# Patient Record
Sex: MALE | Employment: FULL TIME | URBAN - METROPOLITAN AREA
[De-identification: names, ages, dates, MRNs, and addresses within clinical notes are randomized per-mention and may not be internally consistent; named-entity substitution may affect disease eponyms.]

---

## 2021-07-07 ENCOUNTER — HOSPITAL ENCOUNTER (OUTPATIENT)
Dept: ULTRASOUND IMAGING | Age: 61
Discharge: HOME | End: 2021-07-07
Attending: NURSE PRACTITIONER
Payer: COMMERCIAL

## 2021-07-07 DIAGNOSIS — N40.1 BENIGN PROSTATIC HYPERPLASIA WITH LOWER URINARY TRACT SYMPTOMS: ICD-10-CM

## 2021-07-07 PROCEDURE — 76857 US EXAM PELVIC LIMITED: CPT | Performed by: NURSE PRACTITIONER

## 2021-07-08 ENCOUNTER — HOSPITAL ENCOUNTER (OUTPATIENT)
Facility: HOSPITAL | Age: 61
Setting detail: OBSERVATION
Discharge: HOME | End: 2021-07-10
Attending: EMERGENCY MEDICINE | Admitting: STUDENT IN AN ORGANIZED HEALTH CARE EDUCATION/TRAINING PROGRAM
Payer: COMMERCIAL

## 2021-07-08 DIAGNOSIS — N17.9 AKI (ACUTE KIDNEY INJURY) (HCC): ICD-10-CM

## 2021-07-08 DIAGNOSIS — R33.8 ACUTE URINARY RETENTION: Primary | ICD-10-CM

## 2021-07-08 LAB
ANION GAP SERPL CALC-SCNC: 6 MMOL/L (ref 0–18)
BASOPHILS # BLD AUTO: 0.03 X10(3) UL (ref 0–0.2)
BASOPHILS NFR BLD AUTO: 0.5 %
BILIRUB UR QL STRIP.AUTO: NEGATIVE
BUN BLD-MCNC: 44 MG/DL (ref 7–18)
BUN/CREAT SERPL: 12.4 (ref 10–20)
CALCIUM BLD-MCNC: 9 MG/DL (ref 8.5–10.1)
CHLORIDE SERPL-SCNC: 103 MMOL/L (ref 98–112)
CLARITY UR REFRACT.AUTO: CLEAR
CO2 SERPL-SCNC: 26 MMOL/L (ref 21–32)
CREAT BLD-MCNC: 3.54 MG/DL
DEPRECATED RDW RBC AUTO: 40.2 FL (ref 35.1–46.3)
EOSINOPHIL # BLD AUTO: 0.19 X10(3) UL (ref 0–0.7)
EOSINOPHIL NFR BLD AUTO: 3.2 %
ERYTHROCYTE [DISTWIDTH] IN BLOOD BY AUTOMATED COUNT: 12.6 % (ref 11–15)
GLUCOSE BLD-MCNC: 88 MG/DL (ref 70–99)
GLUCOSE UR STRIP.AUTO-MCNC: NEGATIVE MG/DL
HCT VFR BLD AUTO: 36.3 %
HGB BLD-MCNC: 12.4 G/DL
IMM GRANULOCYTES # BLD AUTO: 0.01 X10(3) UL (ref 0–1)
IMM GRANULOCYTES NFR BLD: 0.2 %
KETONES UR STRIP.AUTO-MCNC: NEGATIVE MG/DL
LEUKOCYTE ESTERASE UR QL STRIP.AUTO: NEGATIVE
LYMPHOCYTES # BLD AUTO: 1.78 X10(3) UL (ref 1–4)
LYMPHOCYTES NFR BLD AUTO: 29.9 %
MCH RBC QN AUTO: 29.8 PG (ref 26–34)
MCHC RBC AUTO-ENTMCNC: 34.2 G/DL (ref 31–37)
MCV RBC AUTO: 87.3 FL
MONOCYTES # BLD AUTO: 0.62 X10(3) UL (ref 0.1–1)
MONOCYTES NFR BLD AUTO: 10.4 %
NEUTROPHILS # BLD AUTO: 3.32 X10 (3) UL (ref 1.5–7.7)
NEUTROPHILS # BLD AUTO: 3.32 X10(3) UL (ref 1.5–7.7)
NEUTROPHILS NFR BLD AUTO: 55.8 %
NITRITE UR QL STRIP.AUTO: NEGATIVE
OSMOLALITY SERPL CALC.SUM OF ELEC: 291 MOSM/KG (ref 275–295)
PH UR STRIP.AUTO: 7 [PH] (ref 5–8)
PLATELET # BLD AUTO: 218 10(3)UL (ref 150–450)
POTASSIUM SERPL-SCNC: 5.3 MMOL/L (ref 3.5–5.1)
PROT UR STRIP.AUTO-MCNC: NEGATIVE MG/DL
RBC # BLD AUTO: 4.16 X10(6)UL
SODIUM SERPL-SCNC: 135 MMOL/L (ref 136–145)
SP GR UR STRIP.AUTO: 1 (ref 1–1.03)
UROBILINOGEN UR STRIP.AUTO-MCNC: <2 MG/DL
WBC # BLD AUTO: 6 X10(3) UL (ref 4–11)

## 2021-07-08 PROCEDURE — 99220 INITIAL OBSERVATION CARE,LEVL III: CPT | Performed by: STUDENT IN AN ORGANIZED HEALTH CARE EDUCATION/TRAINING PROGRAM

## 2021-07-08 RX ORDER — PROCHLORPERAZINE EDISYLATE 5 MG/ML
5 INJECTION INTRAMUSCULAR; INTRAVENOUS EVERY 8 HOURS PRN
Status: DISCONTINUED | OUTPATIENT
Start: 2021-07-08 | End: 2021-07-10

## 2021-07-08 RX ORDER — PHENAZOPYRIDINE HYDROCHLORIDE 200 MG/1
200 TABLET, FILM COATED ORAL ONCE
Status: COMPLETED | OUTPATIENT
Start: 2021-07-08 | End: 2021-07-08

## 2021-07-08 RX ORDER — ACETAMINOPHEN 325 MG/1
650 TABLET ORAL EVERY 6 HOURS PRN
Status: DISCONTINUED | OUTPATIENT
Start: 2021-07-08 | End: 2021-07-10

## 2021-07-08 RX ORDER — MELATONIN
3 NIGHTLY PRN
Status: DISCONTINUED | OUTPATIENT
Start: 2021-07-08 | End: 2021-07-10

## 2021-07-08 RX ORDER — MORPHINE SULFATE 4 MG/ML
4 INJECTION, SOLUTION INTRAMUSCULAR; INTRAVENOUS ONCE
Status: COMPLETED | OUTPATIENT
Start: 2021-07-08 | End: 2021-07-08

## 2021-07-08 RX ORDER — POLYETHYLENE GLYCOL 3350 17 G/17G
17 POWDER, FOR SOLUTION ORAL DAILY PRN
Status: DISCONTINUED | OUTPATIENT
Start: 2021-07-08 | End: 2021-07-10

## 2021-07-08 RX ORDER — ONDANSETRON 2 MG/ML
4 INJECTION INTRAMUSCULAR; INTRAVENOUS EVERY 4 HOURS PRN
Status: CANCELLED | OUTPATIENT
Start: 2021-07-08 | End: 2021-07-08

## 2021-07-08 RX ORDER — ACETAMINOPHEN 500 MG
1000 TABLET ORAL ONCE
COMMUNITY

## 2021-07-08 RX ORDER — TAMSULOSIN HYDROCHLORIDE 0.4 MG/1
0.4 CAPSULE ORAL DAILY
Status: DISCONTINUED | OUTPATIENT
Start: 2021-07-08 | End: 2021-07-09

## 2021-07-08 RX ORDER — IBUPROFEN 200 MG
400 TABLET ORAL EVERY 6 HOURS PRN
Status: ON HOLD | COMMUNITY
End: 2021-07-10

## 2021-07-08 RX ORDER — BISACODYL 10 MG
10 SUPPOSITORY, RECTAL RECTAL
Status: DISCONTINUED | OUTPATIENT
Start: 2021-07-08 | End: 2021-07-10

## 2021-07-08 RX ORDER — ONDANSETRON 2 MG/ML
4 INJECTION INTRAMUSCULAR; INTRAVENOUS EVERY 6 HOURS PRN
Status: DISCONTINUED | OUTPATIENT
Start: 2021-07-08 | End: 2021-07-10

## 2021-07-08 RX ORDER — ASPIRIN 81 MG/1
81 TABLET, CHEWABLE ORAL DAILY
Status: ON HOLD | COMMUNITY
End: 2021-07-21

## 2021-07-08 RX ORDER — HYDROCODONE BITARTRATE AND ACETAMINOPHEN 5; 325 MG/1; MG/1
1 TABLET ORAL EVERY 6 HOURS PRN
Status: DISCONTINUED | OUTPATIENT
Start: 2021-07-08 | End: 2021-07-10

## 2021-07-08 RX ORDER — PHENAZOPYRIDINE HYDROCHLORIDE 100 MG/1
100 TABLET, FILM COATED ORAL
Status: DISCONTINUED | OUTPATIENT
Start: 2021-07-09 | End: 2021-07-09

## 2021-07-08 RX ORDER — LIDOCAINE HYDROCHLORIDE 20 MG/ML
10 JELLY TOPICAL AS NEEDED
Status: DISCONTINUED | OUTPATIENT
Start: 2021-07-08 | End: 2021-07-10

## 2021-07-08 RX ORDER — TAMSULOSIN HYDROCHLORIDE 0.4 MG/1
0.4 CAPSULE ORAL DAILY
Status: ON HOLD | COMMUNITY
Start: 2021-05-17 | End: 2021-07-10

## 2021-07-08 NOTE — ED PROVIDER NOTES
Patient Seen in: [facility] Emergency Department      History   Patient presents with:  Urinary Symptoms    Stated Complaint: see call in note    HPI/Subjective:   HPI    74-year-old male presents today for urinary retention.   Patient had a physical Abdominal:      General: Abdomen is flat. Musculoskeletal:         General: Normal range of motion. Skin:     General: Skin is warm. Neurological:      General: No focal deficit present. Mental Status: He is alert.    Psychiatric:         Mood FINDINGS:   BLADDER:  Bladder capacity is large. Prevoid volume is 945 cc. Postvoid volume 784 cc. The bladder was seen with transvesical imaging estimated volume of 67 cc. OTHER:  None. CONCLUSION:  Large postvoid residual urine.   Approximat

## 2021-07-09 ENCOUNTER — APPOINTMENT (OUTPATIENT)
Dept: CT IMAGING | Facility: HOSPITAL | Age: 61
End: 2021-07-09
Attending: UROLOGY
Payer: COMMERCIAL

## 2021-07-09 LAB
ANION GAP SERPL CALC-SCNC: 5 MMOL/L (ref 0–18)
ATRIAL RATE: 73 BPM
BASOPHILS # BLD AUTO: 0.02 X10(3) UL (ref 0–0.2)
BASOPHILS NFR BLD AUTO: 0.3 %
BUN BLD-MCNC: 41 MG/DL (ref 7–18)
BUN/CREAT SERPL: 12.3 (ref 10–20)
CALCIUM BLD-MCNC: 8.3 MG/DL (ref 8.5–10.1)
CHLORIDE SERPL-SCNC: 111 MMOL/L (ref 98–112)
CO2 SERPL-SCNC: 24 MMOL/L (ref 21–32)
CREAT BLD-MCNC: 3.34 MG/DL
DEPRECATED RDW RBC AUTO: 40.4 FL (ref 35.1–46.3)
EOSINOPHIL # BLD AUTO: 0.01 X10(3) UL (ref 0–0.7)
EOSINOPHIL NFR BLD AUTO: 0.1 %
ERYTHROCYTE [DISTWIDTH] IN BLOOD BY AUTOMATED COUNT: 12.4 % (ref 11–15)
GLUCOSE BLD-MCNC: 120 MG/DL (ref 70–99)
HCT VFR BLD AUTO: 34.9 %
HGB BLD-MCNC: 11.7 G/DL
IMM GRANULOCYTES # BLD AUTO: 0.02 X10(3) UL (ref 0–1)
IMM GRANULOCYTES NFR BLD: 0.3 %
LYMPHOCYTES # BLD AUTO: 0.76 X10(3) UL (ref 1–4)
LYMPHOCYTES NFR BLD AUTO: 10.8 %
MCH RBC QN AUTO: 29.7 PG (ref 26–34)
MCHC RBC AUTO-ENTMCNC: 33.5 G/DL (ref 31–37)
MCV RBC AUTO: 88.6 FL
MONOCYTES # BLD AUTO: 0.4 X10(3) UL (ref 0.1–1)
MONOCYTES NFR BLD AUTO: 5.7 %
NEUTROPHILS # BLD AUTO: 5.84 X10 (3) UL (ref 1.5–7.7)
NEUTROPHILS # BLD AUTO: 5.84 X10(3) UL (ref 1.5–7.7)
NEUTROPHILS NFR BLD AUTO: 82.8 %
OSMOLALITY SERPL CALC.SUM OF ELEC: 301 MOSM/KG (ref 275–295)
P AXIS: 66 DEGREES
P-R INTERVAL: 180 MS
PLATELET # BLD AUTO: 213 10(3)UL (ref 150–450)
POTASSIUM SERPL-SCNC: 4.9 MMOL/L (ref 3.5–5.1)
Q-T INTERVAL: 392 MS
QRS DURATION: 86 MS
QTC CALCULATION (BEZET): 431 MS
R AXIS: 1 DEGREES
RBC # BLD AUTO: 3.94 X10(6)UL
SODIUM SERPL-SCNC: 140 MMOL/L (ref 136–145)
T AXIS: 24 DEGREES
VENTRICULAR RATE: 73 BPM
WBC # BLD AUTO: 7.1 X10(3) UL (ref 4–11)

## 2021-07-09 PROCEDURE — 99203 OFFICE O/P NEW LOW 30 MIN: CPT | Performed by: UROLOGY

## 2021-07-09 PROCEDURE — 74176 CT ABD & PELVIS W/O CONTRAST: CPT | Performed by: UROLOGY

## 2021-07-09 PROCEDURE — 99225 SUBSEQUENT OBSERVATION CARE: CPT | Performed by: HOSPITALIST

## 2021-07-09 RX ORDER — PHENAZOPYRIDINE HYDROCHLORIDE 100 MG/1
100 TABLET, FILM COATED ORAL 3 TIMES DAILY PRN
Status: DISCONTINUED | OUTPATIENT
Start: 2021-07-09 | End: 2021-07-10

## 2021-07-09 RX ORDER — TAMSULOSIN HYDROCHLORIDE 0.4 MG/1
0.8 CAPSULE ORAL DAILY
Status: DISCONTINUED | OUTPATIENT
Start: 2021-07-09 | End: 2021-07-10

## 2021-07-09 RX ORDER — HYDROMORPHONE HYDROCHLORIDE 1 MG/ML
0.2 INJECTION, SOLUTION INTRAMUSCULAR; INTRAVENOUS; SUBCUTANEOUS EVERY 2 HOUR PRN
Status: DISCONTINUED | OUTPATIENT
Start: 2021-07-09 | End: 2021-07-10

## 2021-07-09 RX ORDER — HYDROMORPHONE HYDROCHLORIDE 1 MG/ML
0.4 INJECTION, SOLUTION INTRAMUSCULAR; INTRAVENOUS; SUBCUTANEOUS EVERY 2 HOUR PRN
Status: DISCONTINUED | OUTPATIENT
Start: 2021-07-09 | End: 2021-07-10

## 2021-07-09 RX ORDER — SODIUM CHLORIDE 9 MG/ML
INJECTION, SOLUTION INTRAVENOUS CONTINUOUS
Status: DISCONTINUED | OUTPATIENT
Start: 2021-07-09 | End: 2021-07-10

## 2021-07-09 RX ORDER — HYDROMORPHONE HYDROCHLORIDE 1 MG/ML
0.8 INJECTION, SOLUTION INTRAMUSCULAR; INTRAVENOUS; SUBCUTANEOUS EVERY 2 HOUR PRN
Status: DISCONTINUED | OUTPATIENT
Start: 2021-07-09 | End: 2021-07-10

## 2021-07-09 NOTE — PROGRESS NOTES
[facility]  Progress Note     Jovita De Jesus Patient Status:  Observation    1960 MRN [id number]   [facility] 3NW-A Attending Elan Jaimes MD   Hosp Day # 0 PCP vAani Matos MD     Chief Complaint: urinary retention    S: Pa reviewed in Epic. Medications:   • tamsulosin HCl  0.8 mg Oral Daily       ASSESSMENT / PLAN:     1. RAYMOND suspect obstructive  1. IVF  2. Monitor CR, slight improvement  2. Urine retention  1. FC inserted in ED  2. Uro on Cs  3. TAmsulosin, continue  4.

## 2021-07-09 NOTE — PLAN OF CARE
Pt A&Ox4. RA. Pt denies any FLOR, CP or calf pain at this time. Wolf in place, draining red tinged urine, no clots present,  pt denies any pain at this time. Pt tolerating regular diet well, denies any N/V, no BM noted. IVF per order.  Safety precautions in ordered  - Obtain nutritional consult as needed  - Evaluate fluid balance  Outcome: Progressing     Problem: GENITOURINARY - ADULT  Goal: Absence of urinary retention  Description: INTERVENTIONS:  - Assess patient’s ability to void and empty bladder  - Mon

## 2021-07-09 NOTE — H&P
[facility]  History and Physical     Lydia Lilly Patient Status:  Emergency    1960 MRN [id number]   Location [address] [facility] Tila Cruz MD     Chief Complai bruits. Respiratory: Clear to auscultation bilaterally. No wheezes. No rhonchi. Cardiovascular: S1, S2. Regular rate and rhythm. No murmurs, rubs or gallops. Equal pulses. Chest and Back: No tenderness or deformity.   Abdomen: Soft, nontender, nondisten

## 2021-07-09 NOTE — PROGRESS NOTES
Urine draining freely. Ct shows bilateral hydro. Creat decreasing. Recommend:  Continue murray drainage  Check creat  May be discharged when stable and follow up in office for follow up with me next week.

## 2021-07-09 NOTE — PLAN OF CARE
Pt from the emergency room for urinary retention, murray placed in ED 3800cc came out, the color went from yellow to charley to pink. Pt arrived with murray draining red colored urine no clots noted, pt in 8/10 pain gave IV pain meds.   Pt had right 20 gave in nonpharmacologic comfort measures as appropriate  - Advance diet as tolerated, if ordered  - Obtain nutritional consult as needed  - Evaluate fluid balance  Outcome: Progressing     Problem: METABOLIC/FLUID AND ELECTROLYTES - ADULT  Goal: Electrolytes main

## 2021-07-09 NOTE — CONSULTS
[facility]     Report of Consultation    [address]  [phone]       Addy Sy Patient Status:  Observation    1960 MRN [id number]   [facility] 3NW-A Attending Steffanie Hoang MD   Hosp Day # 0 PCP Tanna Tyson Edisylate (COMPAZINE) injection 5 mg, 5 mg, Intravenous, Q8H PRN  •  PEG 3350 (MIRALAX) powder packet 17 g, 17 g, Oral, Daily PRN  •  bisacodyl (DULCOLAX) rectal suppository 10 mg, 10 mg, Rectal, Daily PRN  •  melatonin tab 3 mg, 3 mg, Oral, Nightly PRN  • 07/09/2021     Recent Labs   Lab 07/08/21  1845   COLORUR Straw   CLARITY Clear   SPECGRAVITY 1.004   GLUUR Negative   BILUR Negative   KETUR Negative   BLOODURINE Small*   PHURINE 7.0   PROUR Negative   UROBILINOGEN <2.0   NITRITE Negative   Ragini Efra

## 2021-07-09 NOTE — PROGRESS NOTES
Pt received from ER. Bed in low position. Call light within reach.   Pt axox4 with family at bedside

## 2021-07-10 VITALS
HEIGHT: 68 IN | RESPIRATION RATE: 18 BRPM | TEMPERATURE: 98 F | SYSTOLIC BLOOD PRESSURE: 170 MMHG | DIASTOLIC BLOOD PRESSURE: 88 MMHG | OXYGEN SATURATION: 96 % | HEART RATE: 69 BPM | WEIGHT: 195 LBS | BODY MASS INDEX: 29.55 KG/M2

## 2021-07-10 LAB
ANION GAP SERPL CALC-SCNC: 4 MMOL/L (ref 0–18)
BUN BLD-MCNC: 33 MG/DL (ref 7–18)
BUN/CREAT SERPL: 11.5 (ref 10–20)
CALCIUM BLD-MCNC: 8.4 MG/DL (ref 8.5–10.1)
CHLORIDE SERPL-SCNC: 112 MMOL/L (ref 98–112)
CO2 SERPL-SCNC: 25 MMOL/L (ref 21–32)
CREAT BLD-MCNC: 2.86 MG/DL
GLUCOSE BLD-MCNC: 93 MG/DL (ref 70–99)
OSMOLALITY SERPL CALC.SUM OF ELEC: 299 MOSM/KG (ref 275–295)
POTASSIUM SERPL-SCNC: 4.2 MMOL/L (ref 3.5–5.1)
SODIUM SERPL-SCNC: 141 MMOL/L (ref 136–145)

## 2021-07-10 PROCEDURE — 99217 OBSERVATION CARE DISCHARGE: CPT | Performed by: HOSPITALIST

## 2021-07-10 RX ORDER — METOPROLOL SUCCINATE 25 MG/1
12.5 TABLET, EXTENDED RELEASE ORAL DAILY
Qty: 30 TABLET | Refills: 0 | Status: ON HOLD | OUTPATIENT
Start: 2021-07-10 | End: 2021-09-15

## 2021-07-10 RX ORDER — PHENAZOPYRIDINE HYDROCHLORIDE 100 MG/1
100 TABLET, FILM COATED ORAL DAILY PRN
Qty: 30 TABLET | Refills: 0 | Status: ON HOLD | OUTPATIENT
Start: 2021-07-10 | End: 2021-09-15

## 2021-07-10 RX ORDER — TAMSULOSIN HYDROCHLORIDE 0.4 MG/1
0.8 CAPSULE ORAL DAILY
Qty: 30 CAPSULE | Refills: 0 | Status: SHIPPED | OUTPATIENT
Start: 2021-07-11 | End: 2021-08-10

## 2021-07-10 NOTE — PLAN OF CARE
A & O x4. RA. Tolerating diet. Murray secured, clear, orange (r/t medication). SCD. Up ad clarisse, calling for assist as needed. IVF per mar. Reviewed discharge instructions with patient and sons.  Demonstrated how to empty murray bags; switched over to leg b or PO as ordered and tolerated  - Nasogastric tube to low intermittent suction as ordered  - Evaluate effectiveness of ordered antiemetic medications  - Provide nonpharmacologic comfort measures as appropriate  - Advance diet as tolerated, if ordered  - Ob

## 2021-07-10 NOTE — PLAN OF CARE
Pt alert and orientedx4. Room air. Pulse Ox. SCDs. Regular diet and tolerating well. Tylenol given for headache. Pyridium given as prophylactic. Wolf in place, clear, yellow/orange colored. Denies N/V. Up ad clarisse. IVF infusing.  Denies chest pain or shortne nonpharmacologic comfort measures as appropriate  - Advance diet as tolerated, if ordered  - Obtain nutritional consult as needed  - Evaluate fluid balance  Outcome: Progressing     Problem: GENITOURINARY - ADULT  Goal: Absence of urinary retention  Jorje

## 2021-07-10 NOTE — PROGRESS NOTES
[facility]  Progress Note     Gabi Garcia Patient Status:  Observation    1960 MRN [id number]   [facility] 3NW-A Attending Benny Hoang MD   Hosp Day # 0 PCP Bessie Hernandez MD     Chief Complaint: urinary retention    S: Pa input(s): CRP, NEIL, LDH, DDIMER in the last 168 hours. Imaging: Imaging data reviewed in Epic. Medications:   • tamsulosin HCl  0.8 mg Oral Daily       ASSESSMENT / PLAN:     1. Obstructive RAYMOND  1. Wolf catheter drainage  2.  CT reviewed, b/l hydro

## 2021-07-12 ENCOUNTER — TELEPHONE (OUTPATIENT)
Dept: SURGERY | Facility: CLINIC | Age: 61
End: 2021-07-12

## 2021-07-12 ENCOUNTER — LAB ENCOUNTER (OUTPATIENT)
Dept: LAB | Age: 61
End: 2021-07-12
Attending: HOSPITALIST
Payer: COMMERCIAL

## 2021-07-12 DIAGNOSIS — N17.9 AKI (ACUTE KIDNEY INJURY) (HCC): ICD-10-CM

## 2021-07-12 LAB
ANION GAP SERPL CALC-SCNC: 5 MMOL/L (ref 0–18)
BUN BLD-MCNC: 33 MG/DL (ref 7–18)
BUN/CREAT SERPL: 11.5 (ref 10–20)
CALCIUM BLD-MCNC: 9.6 MG/DL (ref 8.5–10.1)
CHLORIDE SERPL-SCNC: 104 MMOL/L (ref 98–112)
CO2 SERPL-SCNC: 26 MMOL/L (ref 21–32)
CREAT BLD-MCNC: 2.88 MG/DL
GLUCOSE BLD-MCNC: 128 MG/DL (ref 70–99)
OSMOLALITY SERPL CALC.SUM OF ELEC: 289 MOSM/KG (ref 275–295)
PATIENT FASTING Y/N/NP: NO
POTASSIUM SERPL-SCNC: 3.9 MMOL/L (ref 3.5–5.1)
SODIUM SERPL-SCNC: 135 MMOL/L (ref 136–145)

## 2021-07-12 PROCEDURE — 80048 BASIC METABOLIC PNL TOTAL CA: CPT

## 2021-07-12 PROCEDURE — 36415 COLL VENOUS BLD VENIPUNCTURE: CPT

## 2021-07-12 NOTE — TELEPHONE ENCOUNTER
This RN called patient in response to her call:     \"Per patient was discharged from ER and states Dr. Anola Seip wants him to get a basic metabolic panel and wants to see him in office this week. No appointments available.  Please advise\"     Note, he was s

## 2021-07-12 NOTE — TELEPHONE ENCOUNTER
Per patient was discharged from ER and states Dr. Woodroe Alpers wants him to get a basic metabolic panel and wants to see him in office this week. No appointments available.  Please advise

## 2021-07-13 ENCOUNTER — OFFICE VISIT (OUTPATIENT)
Dept: SURGERY | Facility: CLINIC | Age: 61
End: 2021-07-13
Payer: COMMERCIAL

## 2021-07-13 DIAGNOSIS — N40.0 ENLARGED PROSTATE: ICD-10-CM

## 2021-07-13 DIAGNOSIS — N17.9 AKI (ACUTE KIDNEY INJURY) (HCC): Primary | ICD-10-CM

## 2021-07-13 DIAGNOSIS — R97.20 ELEVATED PSA: ICD-10-CM

## 2021-07-13 DIAGNOSIS — N13.30 HYDRONEPHROSIS, UNSPECIFIED HYDRONEPHROSIS TYPE: ICD-10-CM

## 2021-07-13 PROCEDURE — 99213 OFFICE O/P EST LOW 20 MIN: CPT | Performed by: UROLOGY

## 2021-07-13 NOTE — PROGRESS NOTES
Rooming Clinician:     Donte oMreland is a 64year old male. Patient presents with:  Consult: Lorenzo murray from ED visit last Thursday        HPI:     Patient has a history of urinary retention.   He was seen in the hospital emergency department recently being chest pain on exertion  GI: denies abdominal pain and denies heartburn  : see HPI  NEURO: no sensory or motor complaint    EXAM:     There were no vitals taken for this visit.   GENERAL: well developed, well nourished,in no apparent distress  SKIN: no paz 7/07/2021 at 2:34 PM     Finalized by (CST): Sincere Rubalcava MD on 7/07/2021 at 2:35 PM       CT ABDOMEN+PELVIS [facility] 2D RNDR(NO IV,NO ORAL)(CPT=74176)    Result Date: 7/9/2021  PROCEDURE:  CT ABDOMEN+PELVIS [facility] 2D RNDR(NO IV,NO ORAL)(CPT=7417 changes are likely present surrounding the urinary bladder with some fat stranding noted. The prostate gland appears minimally prominent size measuring up to 5.1 x 4.2 cm. LUNG BASES:  No visible pulmonary or pleural disease. OTHER:  Negative.

## 2021-07-15 ENCOUNTER — LAB ENCOUNTER (OUTPATIENT)
Dept: LAB | Age: 61
End: 2021-07-15
Attending: UROLOGY
Payer: COMMERCIAL

## 2021-07-15 DIAGNOSIS — R97.20 ELEVATED PSA: ICD-10-CM

## 2021-07-15 DIAGNOSIS — N17.9 AKI (ACUTE KIDNEY INJURY) (HCC): ICD-10-CM

## 2021-07-15 LAB
ANION GAP SERPL CALC-SCNC: 6 MMOL/L (ref 0–18)
BUN BLD-MCNC: 29 MG/DL (ref 7–18)
BUN/CREAT SERPL: 11.8 (ref 10–20)
CALCIUM BLD-MCNC: 9 MG/DL (ref 8.5–10.1)
CHLORIDE SERPL-SCNC: 98 MMOL/L (ref 98–112)
CO2 SERPL-SCNC: 27 MMOL/L (ref 21–32)
CREAT BLD-MCNC: 2.45 MG/DL
GLUCOSE BLD-MCNC: 93 MG/DL (ref 70–99)
OSMOLALITY SERPL CALC.SUM OF ELEC: 278 MOSM/KG (ref 275–295)
PATIENT FASTING Y/N/NP: YES
POTASSIUM SERPL-SCNC: 3.9 MMOL/L (ref 3.5–5.1)
PSA SERPL-MCNC: 7.79 NG/ML (ref ?–4)
SODIUM SERPL-SCNC: 131 MMOL/L (ref 136–145)

## 2021-07-15 PROCEDURE — 80048 BASIC METABOLIC PNL TOTAL CA: CPT

## 2021-07-15 PROCEDURE — 84153 ASSAY OF PSA TOTAL: CPT

## 2021-07-15 PROCEDURE — 36415 COLL VENOUS BLD VENIPUNCTURE: CPT

## 2021-07-16 ENCOUNTER — OFFICE VISIT (OUTPATIENT)
Dept: NEPHROLOGY | Facility: CLINIC | Age: 61
End: 2021-07-16
Payer: COMMERCIAL

## 2021-07-16 ENCOUNTER — HOSPITAL ENCOUNTER (OUTPATIENT)
Dept: ULTRASOUND IMAGING | Age: 61
Discharge: HOME | End: 2021-07-16
Attending: INTERNAL MEDICINE
Payer: COMMERCIAL

## 2021-07-16 VITALS — DIASTOLIC BLOOD PRESSURE: 84 MMHG | BODY MASS INDEX: 29 KG/M2 | SYSTOLIC BLOOD PRESSURE: 138 MMHG | WEIGHT: 187.81 LBS

## 2021-07-16 DIAGNOSIS — N13.9 OBSTRUCTIVE UROPATHY: Primary | ICD-10-CM

## 2021-07-16 DIAGNOSIS — N17.9 AKI (ACUTE KIDNEY INJURY) (HCC): ICD-10-CM

## 2021-07-16 DIAGNOSIS — N13.9 OBSTRUCTIVE UROPATHY: ICD-10-CM

## 2021-07-16 PROCEDURE — 99203 OFFICE O/P NEW LOW 30 MIN: CPT | Performed by: INTERNAL MEDICINE

## 2021-07-16 PROCEDURE — 76770 US EXAM ABDO BACK WALL COMP: CPT | Performed by: INTERNAL MEDICINE

## 2021-07-16 PROCEDURE — 3075F SYST BP GE 130 - 139MM HG: CPT | Performed by: INTERNAL MEDICINE

## 2021-07-16 PROCEDURE — 3079F DIAST BP 80-89 MM HG: CPT | Performed by: INTERNAL MEDICINE

## 2021-07-16 NOTE — PROGRESS NOTES
Nephrology Consult Note      REASON FOR CONSULT:  RAYMOND         HPI:   Jovita De Jesus is a 64year old male with Patient presents with:  Acute Renal Injury  Other: elevated creatinine  Hydronephrosis    Avani Matos MD    63 y/o male with no sig PMH who was Tobacco Use      Smoking status: Former Smoker      Smokeless tobacco: Never Used    Vaping Use      Vaping Use: Never used    Substance and Sexual Activity      Alcohol use: Yes      Drug use: Never         Family History:  No family history on file.

## 2021-07-19 ENCOUNTER — OFFICE VISIT (OUTPATIENT)
Dept: SURGERY | Facility: CLINIC | Age: 61
End: 2021-07-19
Payer: COMMERCIAL

## 2021-07-19 ENCOUNTER — LAB ENCOUNTER (OUTPATIENT)
Dept: LAB | Facility: HOSPITAL | Age: 61
End: 2021-07-19
Attending: UROLOGY
Payer: COMMERCIAL

## 2021-07-19 ENCOUNTER — TELEPHONE (OUTPATIENT)
Dept: SURGERY | Facility: CLINIC | Age: 61
End: 2021-07-19

## 2021-07-19 DIAGNOSIS — N17.9 AKI (ACUTE KIDNEY INJURY) (HCC): ICD-10-CM

## 2021-07-19 DIAGNOSIS — R33.9 URINARY RETENTION: ICD-10-CM

## 2021-07-19 DIAGNOSIS — R97.20 ELEVATED PSA: ICD-10-CM

## 2021-07-19 DIAGNOSIS — N40.0 ENLARGED PROSTATE: ICD-10-CM

## 2021-07-19 DIAGNOSIS — N13.9 OBSTRUCTIVE UROPATHY: ICD-10-CM

## 2021-07-19 DIAGNOSIS — N13.30 HYDRONEPHROSIS, UNSPECIFIED HYDRONEPHROSIS TYPE: ICD-10-CM

## 2021-07-19 DIAGNOSIS — N17.9 AKI (ACUTE KIDNEY INJURY) (HCC): Primary | ICD-10-CM

## 2021-07-19 LAB
ANION GAP SERPL CALC-SCNC: 8 MMOL/L (ref 0–18)
BASOPHILS # BLD AUTO: 0.02 X10(3) UL (ref 0–0.2)
BASOPHILS NFR BLD AUTO: 0.3 %
BUN BLD-MCNC: 34 MG/DL (ref 7–18)
BUN/CREAT SERPL: 12.9 (ref 10–20)
CALCIUM BLD-MCNC: 9 MG/DL (ref 8.5–10.1)
CHLORIDE SERPL-SCNC: 97 MMOL/L (ref 98–112)
CO2 SERPL-SCNC: 29 MMOL/L (ref 21–32)
CREAT BLD-MCNC: 2.63 MG/DL
DEPRECATED RDW RBC AUTO: 38.8 FL (ref 35.1–46.3)
EOSINOPHIL # BLD AUTO: 0.05 X10(3) UL (ref 0–0.7)
EOSINOPHIL NFR BLD AUTO: 0.7 %
ERYTHROCYTE [DISTWIDTH] IN BLOOD BY AUTOMATED COUNT: 12.1 % (ref 11–15)
GLUCOSE BLD-MCNC: 99 MG/DL (ref 70–99)
HAV IGM SER QL: 2.3 MG/DL (ref 1.6–2.6)
HCT VFR BLD AUTO: 36.8 %
HGB BLD-MCNC: 12.5 G/DL
IMM GRANULOCYTES # BLD AUTO: 0.02 X10(3) UL (ref 0–1)
IMM GRANULOCYTES NFR BLD: 0.3 %
LYMPHOCYTES # BLD AUTO: 1.33 X10(3) UL (ref 1–4)
LYMPHOCYTES NFR BLD AUTO: 18.4 %
MCH RBC QN AUTO: 29.8 PG (ref 26–34)
MCHC RBC AUTO-ENTMCNC: 34 G/DL (ref 31–37)
MCV RBC AUTO: 87.6 FL
MONOCYTES # BLD AUTO: 0.61 X10(3) UL (ref 0.1–1)
MONOCYTES NFR BLD AUTO: 8.4 %
NEUTROPHILS # BLD AUTO: 5.21 X10 (3) UL (ref 1.5–7.7)
NEUTROPHILS # BLD AUTO: 5.21 X10(3) UL (ref 1.5–7.7)
NEUTROPHILS NFR BLD AUTO: 71.9 %
OSMOLALITY SERPL CALC.SUM OF ELEC: 286 MOSM/KG (ref 275–295)
PATIENT FASTING Y/N/NP: YES
PLATELET # BLD AUTO: 225 10(3)UL (ref 150–450)
POTASSIUM SERPL-SCNC: 4.2 MMOL/L (ref 3.5–5.1)
RBC # BLD AUTO: 4.2 X10(6)UL
SODIUM SERPL-SCNC: 134 MMOL/L (ref 136–145)
WBC # BLD AUTO: 7.2 X10(3) UL (ref 4–11)

## 2021-07-19 PROCEDURE — 85025 COMPLETE CBC W/AUTO DIFF WBC: CPT

## 2021-07-19 PROCEDURE — 83735 ASSAY OF MAGNESIUM: CPT

## 2021-07-19 PROCEDURE — 99213 OFFICE O/P EST LOW 20 MIN: CPT | Performed by: UROLOGY

## 2021-07-19 PROCEDURE — 36415 COLL VENOUS BLD VENIPUNCTURE: CPT

## 2021-07-19 PROCEDURE — 80048 BASIC METABOLIC PNL TOTAL CA: CPT

## 2021-07-19 RX ORDER — ACETAMINOPHEN 500 MG
1000 TABLET ORAL ONCE
Status: CANCELLED | OUTPATIENT
Start: 2021-07-19 | End: 2021-07-19

## 2021-07-19 RX ORDER — CIPROFLOXACIN 500 MG/1
500 TABLET, FILM COATED ORAL 2 TIMES DAILY
Qty: 6 TABLET | Refills: 0 | Status: SHIPPED | OUTPATIENT
Start: 2021-07-19 | End: 2021-07-22

## 2021-07-19 RX ORDER — CEFDINIR 300 MG/1
300 CAPSULE ORAL EVERY 12 HOURS
Qty: 6 CAPSULE | Refills: 0 | Status: SHIPPED | OUTPATIENT
Start: 2021-07-19 | End: 2021-07-22

## 2021-07-19 NOTE — TELEPHONE ENCOUNTER
Reviewed pre prostate biopsy instructions with patient,verbalized understanding. And Pre prostate biopsy instructions sent thru my chart. Patient will call with any questions.

## 2021-07-19 NOTE — PROGRESS NOTES
Rooming Clinician:     Crissy Dey is a 64year old male. Patient presents with: Follow - Up: Patient presents for follow up, ER visit 7/8        HPI:     Returns for follow-up visit with his son. Creatinine has decreased minimally to 2.45.   Repeat ul or hernia  NEURO: grossly normal  EXTREMITIES: no cyanosis, clubbing or edema    LAB:     Lab Results   Component Value Date    WBC 7.1 07/09/2021    HGB 11.7 (L) 07/09/2021    HCT 34.9 (L) 07/09/2021    .0 07/09/2021    CREATSERUM 2.45 (H) 07/15/20 moderate hydronephrosis. CYSTS/STONES/MASSES:  None. LEFT KIDNEY MEASUREMENTS:  11.2 cm ECHOGENICITY:  Normal. HYDRONEPHROSIS:  There is moderate hydronephrosis. CYSTS/STONES/MASSES:  None. BLADDER:  There is marked trabeculation of the bladder wall.   Anjel Martinez No visible dilatation or calcification. PANCREAS:  No lesion, fluid collection, ductal dilatation, or atrophy. SPLEEN:  No enlargement or focal lesion. AORTA/VASCULAR:  No aneurysm. RETROPERITONEUM:  No mass or adenopathy.   BOWEL/MESENTERY:  No dilated proceed.     Diagnoses and all orders for this visit:    RAYMOND (acute kidney injury) ([facility] [address]    Elevated PSA    Enlarged prostate    Urinary retention    Hydronephrosis, unspecified hydronephrosis type            The patient indicates understanding of these iss

## 2021-07-20 ENCOUNTER — ANESTHESIA EVENT (OUTPATIENT)
Dept: SURGERY | Facility: HOSPITAL | Age: 61
End: 2021-07-20
Payer: COMMERCIAL

## 2021-07-20 NOTE — H&P
HPI:     Returns for follow-up visit with his son. Creatinine has decreased minimally to 2.45. Repeat ultrasound shows bilateral hydronephrosis. Enlarged prostate. PSA is 7.79.   Prostate is enlarged on digital exam.    Current Outpatient Medication (1.727 m)   Wt 180 lb (81.6 kg)   BMI 27.37 kg/m²   GENERAL: well developed, well nourished,in no apparent distress  SKIN: no rashes,no suspicious lesions  HEENT: atraumatic, normocephalic,ears and throat are clear  NECK: supple  LUNGS: normal respiratory (acute kidney injury) ([facility] [address] N13.9 Obstructive uropathy  TECHNIQUE:  Transabdominal gray scale ultrasound imaging of the bilateral kidneys and bladder was performed. Routine technique was utilized.    PATIENT STATED HISTORY: (As transcribed by Technologist) without intravenous or oral contrast.  KIDNEYS:  Kidneys demonstrate moderate bilateral hydroureteronephrosis. No discrete obstructing lesion is seen.   Please note that the kidneys are limited assessment without intravenous contrast. ADRENALS:  No mass or ASSESSMENT:     Bilateral hydronephrosis  Acute urinary retention with acute/chronic kidney disease  Elevated PSA    PLAN:     Digital guided needle biopsies of prostate  Cystoscopy cystometrogram, bilateral retrograde pyelogram, possible ureteral ca

## 2021-07-21 ENCOUNTER — APPOINTMENT (OUTPATIENT)
Dept: GENERAL RADIOLOGY | Facility: HOSPITAL | Age: 61
End: 2021-07-21
Attending: UROLOGY
Payer: COMMERCIAL

## 2021-07-21 ENCOUNTER — ANESTHESIA (OUTPATIENT)
Dept: SURGERY | Facility: HOSPITAL | Age: 61
End: 2021-07-21
Payer: COMMERCIAL

## 2021-07-21 ENCOUNTER — HOSPITAL ENCOUNTER (OUTPATIENT)
Facility: HOSPITAL | Age: 61
Setting detail: HOSPITAL OUTPATIENT SURGERY
Discharge: HOME | End: 2021-07-21
Attending: UROLOGY | Admitting: UROLOGY
Payer: COMMERCIAL

## 2021-07-21 VITALS
HEIGHT: 68 IN | WEIGHT: 180.25 LBS | DIASTOLIC BLOOD PRESSURE: 79 MMHG | OXYGEN SATURATION: 100 % | RESPIRATION RATE: 16 BRPM | HEART RATE: 70 BPM | BODY MASS INDEX: 27.32 KG/M2 | TEMPERATURE: 98 F | SYSTOLIC BLOOD PRESSURE: 133 MMHG

## 2021-07-21 DIAGNOSIS — N17.9 AKI (ACUTE KIDNEY INJURY) (HCC): ICD-10-CM

## 2021-07-21 DIAGNOSIS — R97.20 ELEVATED PSA: ICD-10-CM

## 2021-07-21 DIAGNOSIS — N40.0 ENLARGED PROSTATE: ICD-10-CM

## 2021-07-21 DIAGNOSIS — N13.30 HYDRONEPHROSIS, UNSPECIFIED HYDRONEPHROSIS TYPE: ICD-10-CM

## 2021-07-21 DIAGNOSIS — R33.9 URINARY RETENTION: ICD-10-CM

## 2021-07-21 PROCEDURE — 52005 CYSTO W/URTRL CATHJ: CPT | Performed by: UROLOGY

## 2021-07-21 PROCEDURE — 0TJB8ZZ INSPECTION OF BLADDER, VIA NATURAL OR ARTIFICIAL OPENING ENDOSCOPIC: ICD-10-PCS | Performed by: UROLOGY

## 2021-07-21 PROCEDURE — 76942 ECHO GUIDE FOR BIOPSY: CPT | Performed by: UROLOGY

## 2021-07-21 PROCEDURE — 0VB03ZX EXCISION OF PROSTATE, PERCUTANEOUS APPROACH, DIAGNOSTIC: ICD-10-PCS | Performed by: UROLOGY

## 2021-07-21 PROCEDURE — 55700 BIOPSY OF PROSTATE,NEEDLE/PUNCH: CPT | Performed by: UROLOGY

## 2021-07-21 RX ORDER — EPHEDRINE SULFATE 50 MG/ML
INJECTION INTRAVENOUS AS NEEDED
Status: DISCONTINUED | OUTPATIENT
Start: 2021-07-21 | End: 2021-07-21 | Stop reason: SURG

## 2021-07-21 RX ORDER — LIDOCAINE HYDROCHLORIDE 10 MG/ML
INJECTION, SOLUTION EPIDURAL; INFILTRATION; INTRACAUDAL; PERINEURAL AS NEEDED
Status: DISCONTINUED | OUTPATIENT
Start: 2021-07-21 | End: 2021-07-21 | Stop reason: SURG

## 2021-07-21 RX ORDER — DEXAMETHASONE SODIUM PHOSPHATE 4 MG/ML
VIAL (ML) INJECTION AS NEEDED
Status: DISCONTINUED | OUTPATIENT
Start: 2021-07-21 | End: 2021-07-21 | Stop reason: SURG

## 2021-07-21 RX ORDER — SODIUM CHLORIDE, SODIUM LACTATE, POTASSIUM CHLORIDE, CALCIUM CHLORIDE 600; 310; 30; 20 MG/100ML; MG/100ML; MG/100ML; MG/100ML
INJECTION, SOLUTION INTRAVENOUS CONTINUOUS
Status: DISCONTINUED | OUTPATIENT
Start: 2021-07-21 | End: 2021-07-21

## 2021-07-21 RX ORDER — CEFAZOLIN SODIUM/WATER 2 G/20 ML
2 SYRINGE (ML) INTRAVENOUS ONCE
Status: COMPLETED | OUTPATIENT
Start: 2021-07-21 | End: 2021-07-21

## 2021-07-21 RX ORDER — ONDANSETRON 2 MG/ML
4 INJECTION INTRAMUSCULAR; INTRAVENOUS AS NEEDED
Status: DISCONTINUED | OUTPATIENT
Start: 2021-07-21 | End: 2021-07-21

## 2021-07-21 RX ORDER — MIDAZOLAM HYDROCHLORIDE 1 MG/ML
INJECTION INTRAMUSCULAR; INTRAVENOUS AS NEEDED
Status: DISCONTINUED | OUTPATIENT
Start: 2021-07-21 | End: 2021-07-21 | Stop reason: SURG

## 2021-07-21 RX ORDER — PHENYLEPHRINE HCL 10 MG/ML
VIAL (ML) INJECTION AS NEEDED
Status: DISCONTINUED | OUTPATIENT
Start: 2021-07-21 | End: 2021-07-21 | Stop reason: SURG

## 2021-07-21 RX ORDER — HYDROCODONE BITARTRATE AND ACETAMINOPHEN 5; 325 MG/1; MG/1
2 TABLET ORAL AS NEEDED
Status: COMPLETED | OUTPATIENT
Start: 2021-07-21 | End: 2021-07-21

## 2021-07-21 RX ORDER — ONDANSETRON 2 MG/ML
INJECTION INTRAMUSCULAR; INTRAVENOUS AS NEEDED
Status: DISCONTINUED | OUTPATIENT
Start: 2021-07-21 | End: 2021-07-21 | Stop reason: SURG

## 2021-07-21 RX ORDER — HYDROCODONE BITARTRATE AND ACETAMINOPHEN 5; 325 MG/1; MG/1
1 TABLET ORAL AS NEEDED
Status: COMPLETED | OUTPATIENT
Start: 2021-07-21 | End: 2021-07-21

## 2021-07-21 RX ORDER — NALOXONE HYDROCHLORIDE 0.4 MG/ML
80 INJECTION, SOLUTION INTRAMUSCULAR; INTRAVENOUS; SUBCUTANEOUS AS NEEDED
Status: DISCONTINUED | OUTPATIENT
Start: 2021-07-21 | End: 2021-07-21

## 2021-07-21 RX ORDER — HYDROMORPHONE HYDROCHLORIDE 1 MG/ML
0.4 INJECTION, SOLUTION INTRAMUSCULAR; INTRAVENOUS; SUBCUTANEOUS EVERY 5 MIN PRN
Status: DISCONTINUED | OUTPATIENT
Start: 2021-07-21 | End: 2021-07-21

## 2021-07-21 RX ORDER — ACETAMINOPHEN 500 MG
1000 TABLET ORAL ONCE AS NEEDED
Status: DISCONTINUED | OUTPATIENT
Start: 2021-07-21 | End: 2021-07-21

## 2021-07-21 RX ADMIN — ONDANSETRON 4 MG: 2 INJECTION INTRAMUSCULAR; INTRAVENOUS at 08:02:00

## 2021-07-21 RX ADMIN — PHENYLEPHRINE HCL 50 MCG: 10 MG/ML VIAL (ML) INJECTION at 08:01:00

## 2021-07-21 RX ADMIN — MIDAZOLAM HYDROCHLORIDE 2 MG: 1 INJECTION INTRAMUSCULAR; INTRAVENOUS at 07:42:00

## 2021-07-21 RX ADMIN — LIDOCAINE HYDROCHLORIDE 50 MG: 10 INJECTION, SOLUTION EPIDURAL; INFILTRATION; INTRACAUDAL; PERINEURAL at 07:45:00

## 2021-07-21 RX ADMIN — EPHEDRINE SULFATE 10 MG: 50 INJECTION INTRAVENOUS at 07:57:00

## 2021-07-21 RX ADMIN — PHENYLEPHRINE HCL 50 MCG: 10 MG/ML VIAL (ML) INJECTION at 08:43:00

## 2021-07-21 RX ADMIN — DEXAMETHASONE SODIUM PHOSPHATE 4 MG: 4 MG/ML VIAL (ML) INJECTION at 08:02:00

## 2021-07-21 RX ADMIN — PHENYLEPHRINE HCL 50 MCG: 10 MG/ML VIAL (ML) INJECTION at 08:33:00

## 2021-07-21 RX ADMIN — CEFAZOLIN SODIUM/WATER 2 G: 2 G/20 ML SYRINGE (ML) INTRAVENOUS at 07:56:00

## 2021-07-21 NOTE — OPERATIVE REPORT
Operative Note    Patient Name: Cody Monroe    Date of Procedure: 7/21/2021    Preoperative Diagnosis: RAYMOND (acute kidney injury) ([facility] [address] [N17.9]  Elevated PSA [R97.20]  Enlarged prostate [N40.0]  Urinary retention [R33.9]  Hydronephrosis, unspecified separately to pathology for histologic analysis. Then the patient's genitalia were then prepped and he was draped in a sterile fashion.   25 Arabic cystoscope was introduced into the bladder and the distal urethra was found to be normal.  The prostate was appropriate then we will proceed with transurethral prostatectomy next week. Iftikhar Francois.  Ale Hall M.D.

## 2021-07-21 NOTE — ANESTHESIA PREPROCEDURE EVALUATION
PRE-OP EVALUATION    Patient Name: Bhavesh Marc    Admit Diagnosis: RAYMOND (acute kidney injury) ([facility] [address] [N17.9]  Elevated PSA [R97.20]  Enlarged prostate [N40.0]  Urinary retention [R33.9]  Hydronephrosis, unspecified hydronephrosis type [N13.30]    Pre-op Take 1,000 mg by mouth every 6 (six) hours as needed for Pain or Fever., Disp: , Rfl: , 7/21/2021 at 0330  aspirin 81 MG Oral Chew Tab, Chew 81 mg by mouth daily.   , Disp: , Rfl: , 7/16/2021        Allergies: Yellow Jacket Venom      Anesthesia Evaluation trauma or trauma to lips/gums due to instrumentation, intubation, biting of the airway device, neuropathy from positioning and aspiration. The anesthesia consent form was reviewed by the patient.  Patient understands all risks and benefits and wishes to pro

## 2021-07-21 NOTE — ANESTHESIA PROCEDURE NOTES
Airway  Date/Time: 7/21/2021 8:11 AM  Urgency: elective      General Information and Staff    Patient location during procedure: OR  Anesthesiologist: Yudy Martino MD  Performed: anesthesiologist     Indications and Patient Condition  Indications for

## 2021-07-21 NOTE — ANESTHESIA POSTPROCEDURE EVALUATION
[address] [place] [address] Patient Status:  Hospital Outpatient Surgery   Age/Gender 64year old male MRN [id number]   [facility] SURGERY Attending Leonel Elizabeth MD   Hosp Day # 0 PCP Dimas Cruz MD       Anesthesia Post-op Not

## 2021-07-23 ENCOUNTER — TELEPHONE (OUTPATIENT)
Dept: SURGERY | Facility: CLINIC | Age: 61
End: 2021-07-23

## 2021-07-23 DIAGNOSIS — N13.30 HYDRONEPHROSIS, UNSPECIFIED HYDRONEPHROSIS TYPE: Primary | ICD-10-CM

## 2021-07-23 NOTE — TELEPHONE ENCOUNTER
Per pt spoke to Dr. Dinorah Negron yesterday, states orders were supposed to be placed, pt unsure exactly what type of orders, states he called central scheduling and was informed no orders had been placed. Please call thank you.

## 2021-07-23 NOTE — TELEPHONE ENCOUNTER
This RN called [facility] and spoke to Lion Christian. RN explained and confirmed that order is for [facility] location since patient lives here in [facility]. Order will be carried out.      \"Radiology department calling in regards to order place for IR Nephrostomy tub

## 2021-07-23 NOTE — TELEPHONE ENCOUNTER
This RN carried out an order from Dr Zhang Chávez :     Order to IR: Bilateral nephrotomy tube and antegrade stents.     \"Per pt spoke to Dr. Zhang Chávez yesterday, states orders were supposed to be placed, pt unsure exactly what type of orders, states he called ce

## 2021-07-23 NOTE — TELEPHONE ENCOUNTER
Radiology department calling in regards to order place for IR Nephrostomy tube, it was placed for [facility] instead of [facility] and they want to confirm where it should be done since patient lives in [facility]

## 2021-07-29 ENCOUNTER — HOSPITAL ENCOUNTER (OUTPATIENT)
Dept: INTERVENTIONAL RADIOLOGY/VASCULAR | Facility: HOSPITAL | Age: 61
Discharge: HOME | End: 2021-07-29
Attending: UROLOGY | Admitting: UROLOGY
Payer: COMMERCIAL

## 2021-07-29 VITALS
SYSTOLIC BLOOD PRESSURE: 131 MMHG | DIASTOLIC BLOOD PRESSURE: 66 MMHG | RESPIRATION RATE: 13 BRPM | TEMPERATURE: 98 F | HEIGHT: 68 IN | BODY MASS INDEX: 27.28 KG/M2 | HEART RATE: 51 BPM | OXYGEN SATURATION: 100 % | WEIGHT: 180 LBS

## 2021-07-29 DIAGNOSIS — N13.30 HYDRONEPHROSIS, UNSPECIFIED HYDRONEPHROSIS TYPE: ICD-10-CM

## 2021-07-29 LAB — INR: 1.1 (ref 0.8–1.3)

## 2021-07-29 PROCEDURE — 99152 MOD SED SAME PHYS/QHP 5/>YRS: CPT

## 2021-07-29 PROCEDURE — 50432 PLMT NEPHROSTOMY CATHETER: CPT

## 2021-07-29 PROCEDURE — 99153 MOD SED SAME PHYS/QHP EA: CPT

## 2021-07-29 PROCEDURE — 0T9430Z DRAINAGE OF LEFT KIDNEY PELVIS WITH DRAINAGE DEVICE, PERCUTANEOUS APPROACH: ICD-10-PCS | Performed by: RADIOLOGY

## 2021-07-29 PROCEDURE — 0T9330Z DRAINAGE OF RIGHT KIDNEY PELVIS WITH DRAINAGE DEVICE, PERCUTANEOUS APPROACH: ICD-10-PCS | Performed by: RADIOLOGY

## 2021-07-29 PROCEDURE — 85610 PROTHROMBIN TIME: CPT

## 2021-07-29 RX ORDER — SODIUM CHLORIDE 9 MG/ML
INJECTION, SOLUTION INTRAVENOUS CONTINUOUS
Status: DISCONTINUED | OUTPATIENT
Start: 2021-07-29 | End: 2021-07-29

## 2021-07-29 RX ORDER — MIDAZOLAM HYDROCHLORIDE 1 MG/ML
INJECTION INTRAMUSCULAR; INTRAVENOUS
Status: COMPLETED
Start: 2021-07-29 | End: 2021-07-29

## 2021-07-29 RX ORDER — LEVOFLOXACIN 5 MG/ML
INJECTION, SOLUTION INTRAVENOUS
Status: COMPLETED
Start: 2021-07-29 | End: 2021-07-29

## 2021-07-29 RX ORDER — LIDOCAINE HYDROCHLORIDE 10 MG/ML
INJECTION, SOLUTION INFILTRATION; PERINEURAL
Status: COMPLETED
Start: 2021-07-29 | End: 2021-07-29

## 2021-07-29 NOTE — PROGRESS NOTES
Pt A&O x 4 and ready to go home. VSS on RA. Pt has no complaints. Ricky neph tube sites CDI. Pt has no complaints. Urine emptied from tubes and Wolf. Discharge instructions reviewed with pt. All questions answered. IV and tele d/chepe.  Pt brought to lobby wit

## 2021-07-30 ENCOUNTER — TELEPHONE (OUTPATIENT)
Dept: SURGERY | Facility: CLINIC | Age: 61
End: 2021-07-30

## 2021-07-30 NOTE — TELEPHONE ENCOUNTER
RN called patient to relay MD's message and recommendations:       Cystogram also explained. Note, he has Wolf and he is a S/P Neprostomy tubes placed 7/29. He is agreeable to plans and verbalized understanding. He agreed to 8/6 Fri 10:45am appt.

## 2021-08-04 NOTE — DISCHARGE SUMMARY
[facility]OSPITALIST  DISCHARGE SUMMARY     She Schneider Patient Status:  Observation    1960 MRN [id number]   [facility] 3NW-A Attending No att. providers found   [address] Day # 0 PCP Pawan Stockton MD     Date of Admission: 2021  Da tablet  Refills: 0        CHANGE how you take these medications      Instructions Prescription details   tamsulosin HCl 0.4 MG Caps  Commonly known as: FLOMAX  What changed: how much to take      Take 2 capsules (0.8 mg total) by mouth daily.    Stop taking facility. &nbsp;   If you suspect you may be pregnant please consult with your physician prior to your exam. If you have a continuous glucose monitor you may be asked to remove it during the exam.    Because of the nature of the Emergency Department, please extremities. Extremities: No edema.   -----------------------------------------------------------------------------------------------  PATIENT DISCHARGE INSTRUCTIONS: See electronic chart    Maura Nice MD    Time spent:  > 30 minutes

## 2021-08-05 ENCOUNTER — HOSPITAL ENCOUNTER (OUTPATIENT)
Dept: GENERAL RADIOLOGY | Facility: HOSPITAL | Age: 61
Discharge: HOME | End: 2021-08-05
Attending: UROLOGY
Payer: COMMERCIAL

## 2021-08-05 ENCOUNTER — TELEPHONE (OUTPATIENT)
Dept: NEPHROLOGY | Facility: CLINIC | Age: 61
End: 2021-08-05

## 2021-08-05 DIAGNOSIS — N13.30 HYDRONEPHROSIS, UNSPECIFIED HYDRONEPHROSIS TYPE: ICD-10-CM

## 2021-08-05 PROCEDURE — 51600 INJECTION FOR BLADDER X-RAY: CPT | Performed by: UROLOGY

## 2021-08-05 PROCEDURE — 74430 CONTRAST X-RAY BLADDER: CPT | Performed by: UROLOGY

## 2021-08-05 NOTE — PROGRESS NOTES
Your recent cystogram shows no evidence of reflux and  is normal.  Recommend follow up in the office as directed. I have ordered a repeat basic metabolic panel for you. Please do this prior to coming into the office.     Sincerely,  Hermilo Smith MD

## 2021-08-06 ENCOUNTER — TELEPHONE (OUTPATIENT)
Dept: SURGERY | Facility: CLINIC | Age: 61
End: 2021-08-06

## 2021-08-06 ENCOUNTER — LAB ENCOUNTER (OUTPATIENT)
Dept: LAB | Facility: HOSPITAL | Age: 61
End: 2021-08-06
Attending: UROLOGY
Payer: COMMERCIAL

## 2021-08-06 ENCOUNTER — OFFICE VISIT (OUTPATIENT)
Dept: SURGERY | Facility: CLINIC | Age: 61
End: 2021-08-06
Payer: COMMERCIAL

## 2021-08-06 DIAGNOSIS — N17.9 AKI (ACUTE KIDNEY INJURY) (HCC): ICD-10-CM

## 2021-08-06 DIAGNOSIS — N13.30 HYDRONEPHROSIS, UNSPECIFIED HYDRONEPHROSIS TYPE: ICD-10-CM

## 2021-08-06 DIAGNOSIS — R33.9 URINARY RETENTION: Primary | ICD-10-CM

## 2021-08-06 DIAGNOSIS — N40.0 ENLARGED PROSTATE: ICD-10-CM

## 2021-08-06 LAB
ANION GAP SERPL CALC-SCNC: 5 MMOL/L (ref 0–18)
BUN BLD-MCNC: 15 MG/DL (ref 7–18)
CALCIUM BLD-MCNC: 9.1 MG/DL (ref 8.5–10.1)
CHLORIDE SERPL-SCNC: 98 MMOL/L (ref 98–112)
CO2 SERPL-SCNC: 28 MMOL/L (ref 21–32)
CREAT BLD-MCNC: 2.1 MG/DL
GLUCOSE BLD-MCNC: 99 MG/DL (ref 70–99)
OSMOLALITY SERPL CALC.SUM OF ELEC: 273 MOSM/KG (ref 275–295)
PATIENT FASTING Y/N/NP: YES
POTASSIUM SERPL-SCNC: 4.1 MMOL/L (ref 3.5–5.1)
SODIUM SERPL-SCNC: 131 MMOL/L (ref 136–145)

## 2021-08-06 PROCEDURE — 36415 COLL VENOUS BLD VENIPUNCTURE: CPT

## 2021-08-06 PROCEDURE — 99213 OFFICE O/P EST LOW 20 MIN: CPT | Performed by: UROLOGY

## 2021-08-06 PROCEDURE — 80048 BASIC METABOLIC PNL TOTAL CA: CPT

## 2021-08-06 RX ORDER — TAMSULOSIN HYDROCHLORIDE 0.4 MG/1
0.4 CAPSULE ORAL DAILY
Qty: 30 CAPSULE | Refills: 2 | Status: SHIPPED | OUTPATIENT
Start: 2021-08-06 | End: 2021-09-05

## 2021-08-06 NOTE — PROGRESS NOTES
Rooming Clinician:     Laila Chamorro is a 64year old male. Patient presents with: Follow - Up: s/p chastity neph tube placement 7/29/21. also has murray        HPI:     Patient returns with his wife and son. He did a cystogram showing no reflux.   He sandi GENERAL HEALTH: feels well otherwise  SKIN: denies any unusual skin lesions or rashes  RESPIRATORY: denies shortness of breath with exertion  CARDIOVASCULAR: denies chest pain on exertion  GI: denies abdominal pain and denies heartburn  : see HPI  NE

## 2021-08-06 NOTE — TELEPHONE ENCOUNTER
My chart message sent to patient regarding Dr. Joe Costa comments below        ----- Message from João Washington MD sent at 8/5/2021  2:02 PM CDT -----  Your recent cystogram shows no evidence of reflux and  is normal.  Recommend follow up in the office

## 2021-08-09 ENCOUNTER — TELEPHONE (OUTPATIENT)
Dept: SURGERY | Facility: CLINIC | Age: 61
End: 2021-08-09

## 2021-08-09 DIAGNOSIS — N13.30 HYDRONEPHROSIS, UNSPECIFIED HYDRONEPHROSIS TYPE: Primary | ICD-10-CM

## 2021-08-09 DIAGNOSIS — S37.009S INJURY OF KIDNEY, UNSPECIFIED LATERALITY, SEQUELA: ICD-10-CM

## 2021-08-09 DIAGNOSIS — R33.9 URINARY RETENTION: ICD-10-CM

## 2021-08-09 DIAGNOSIS — N17.9 AKI (ACUTE KIDNEY INJURY) (HCC): ICD-10-CM

## 2021-08-09 NOTE — TELEPHONE ENCOUNTER
Surgeon: [facility]/ASC : edward  Assistant:   Location:   Procedure: Cystoscopy, TURP  Anesthesia: Spinal  Time frame: 90 minutes  Diagnosis: Urinary retention    Special instructions/equipment:  Postop follow-up:      Patient is going to hav

## 2021-08-10 NOTE — TELEPHONE ENCOUNTER
I called the pt to schedule nurse visit for murray exchange. He will speak to his wife and then call me back with availability.

## 2021-08-10 NOTE — TELEPHONE ENCOUNTER
Scheduled patient for procedure on 9/1/21 at 9:45 a.m. with IR to see prior to place bilateral antegrade stents. Order placed by nursing. Whit Gallardo at THE [facility] IR to confirm date and time. Called patient and related the above information.   Patient is s

## 2021-08-10 NOTE — TELEPHONE ENCOUNTER
I spoke to RP about stents being placed by IR and he said if the patient prefers not to do them RP is okay with that.       I called the pt to discuss this with him and lmtcb

## 2021-08-11 NOTE — TELEPHONE ENCOUNTER
I called the pt and discussed that RP is okay with not proceeding with the stent placement with IR. Patient agrees and would like to proceed only with the Cystoscopy, TURP.   Pt also requested that nurse visit for murray exchange be changed to 11:30 tomorrow

## 2021-08-12 ENCOUNTER — NURSE ONLY (OUTPATIENT)
Dept: SURGERY | Facility: CLINIC | Age: 61
End: 2021-08-12
Payer: COMMERCIAL

## 2021-08-12 DIAGNOSIS — R33.8 ACUTE URINARY RETENTION: ICD-10-CM

## 2021-08-12 PROCEDURE — 51702 INSERT TEMP BLADDER CATH: CPT | Performed by: UROLOGY

## 2021-08-12 NOTE — TELEPHONE ENCOUNTER
Cancelled IR placement of antegrade stents per message below.   Also notified Concha Merrill in [place]

## 2021-08-12 NOTE — PROGRESS NOTES
Patient verified name and . Pt was placed in supine position. Using aseptic technique balloon was deflated and murray removed. Pt was draped and prepped in sterile fashion.   5 mL lidocaine injected into urethra and gauzed held to urethra opening for 1-2

## 2021-08-13 ENCOUNTER — PATIENT MESSAGE (OUTPATIENT)
Dept: SURGERY | Facility: CLINIC | Age: 61
End: 2021-08-13

## 2021-08-13 NOTE — TELEPHONE ENCOUNTER
Below is patient's message sent via [employer]:     From: She Schneider  To: Mick Spencer MD  Sent: 8/13/2021 12:45 PM CDT  Subject: Test Results Question    Two questions -   1.  During the biopsy, which imaging guidance was used (transrectal Julius

## 2021-08-23 ENCOUNTER — TELEPHONE (OUTPATIENT)
Dept: SURGERY | Facility: CLINIC | Age: 61
End: 2021-08-23

## 2021-08-23 ENCOUNTER — PATIENT MESSAGE (OUTPATIENT)
Dept: SURGERY | Facility: CLINIC | Age: 61
End: 2021-08-23

## 2021-08-23 DIAGNOSIS — N17.9 AKI (ACUTE KIDNEY INJURY) (HCC): ICD-10-CM

## 2021-08-23 DIAGNOSIS — N40.0 ENLARGED PROSTATE: ICD-10-CM

## 2021-08-23 DIAGNOSIS — N13.30 HYDRONEPHROSIS, UNSPECIFIED HYDRONEPHROSIS TYPE: Primary | ICD-10-CM

## 2021-08-23 DIAGNOSIS — S37.009S INJURY OF KIDNEY, UNSPECIFIED LATERALITY, SEQUELA: ICD-10-CM

## 2021-08-23 NOTE — TELEPHONE ENCOUNTER
Patient states he is having surgery on 09/01/21 but would like to get an MRI done before that. Please advise.

## 2021-08-24 NOTE — TELEPHONE ENCOUNTER
Below is patient's message sent via [email or web address]:     From: Danielle Castellanos  To: Thompson Siddiqui MD  Sent: 8/23/2021 10:21 PM CDT  Subject: Other    I have been reading a lot about the prostrate and would like to get the MRI done before the TURP.  Thanks    RN

## 2021-08-24 NOTE — TELEPHONE ENCOUNTER
This encounter is now closed. \"Patient states he is having surgery on 09/01/21 but would like to get an MRI done before that. Please advise. \"    RN replied via [address] [place] [address] [address] today, 8/24.

## 2021-08-26 ENCOUNTER — PATIENT MESSAGE (OUTPATIENT)
Dept: SURGERY | Facility: CLINIC | Age: 61
End: 2021-08-26

## 2021-08-27 NOTE — PAT NURSING NOTE
Patient wants to reschedule procedure, instructed to call the office to reschedule. Also left a message to [employer].

## 2021-08-27 NOTE — TELEPHONE ENCOUNTER
RN received a verbal order from Dr Magali Cook to place MRI prostate. Order placed at [facility]. Patient was informed. Answered all his questions and concerns. Referred to Satnam Marcano for request to reschedule his TURP after MRI. He verbalized understanding to plans.

## 2021-08-29 NOTE — H&P
HPI:     Patient returns with his wife and son. He did a cystogram showing no reflux. He brought logs of input and output showing roughly equivalent drainage from each nephrostomy tube. Very little drainage of any from Wolf catheter.   Discussed wit see HPI  NEURO: no sensory or motor complaint    EXAM:     There were no vitals taken for this visit.   GENERAL: well developed, well nourished,in no apparent distress  SKIN: no rashes,no suspicious lesions  HEENT: atraumatic, normocephalic,ears and throat prostate surgery there is a rare risk of incontinence postoperatively but this is an extremely rare complication.   Patient 's are instructed to rest for the 1st several weeks with limited activities and may generally return to full activities within 4-6 we

## 2021-08-30 ENCOUNTER — TELEPHONE (OUTPATIENT)
Dept: SURGERY | Facility: CLINIC | Age: 61
End: 2021-08-30

## 2021-08-30 DIAGNOSIS — R33.9 URINARY RETENTION: Primary | ICD-10-CM

## 2021-08-30 NOTE — TELEPHONE ENCOUNTER
S/w patient. All questions answered. Patient is planning for MRI prior to TURP as per discussion with Dr. Dunia Good.

## 2021-08-30 NOTE — TELEPHONE ENCOUNTER
From: Micki Montemayor  To: Brandy Dickinson MD  Sent: 8/26/2021 3:19 PM CDT  Subject: Other    Helrocio Oliveira Dr. I missed your call, as I was returning the call I got from [place] earlier in the day.  I have a few questions, for you:    Q1 Radhika forbid

## 2021-08-30 NOTE — TELEPHONE ENCOUNTER
Patient contacted me this date wishing to reschedule his surgery on 9/1/21 to 9/15/21 which was done so this date via surgical case change request.  New instructions sent to patient thru My Chart showing new date and time.

## 2021-09-07 ENCOUNTER — PATIENT MESSAGE (OUTPATIENT)
Dept: SURGERY | Facility: CLINIC | Age: 61
End: 2021-09-07

## 2021-09-08 NOTE — TELEPHONE ENCOUNTER
Below is patient's message sent via [employer]:     From: Inga Quiroga  To: Raiza Andrade MD  Sent: 9/7/2021  7:00 PM CDT  Subject: Prescription Question    FYI - My  BP medication- Metoprolol Succinate ER 25 mg dosage has changed.  It has gone up from

## 2021-09-09 ENCOUNTER — TELEPHONE (OUTPATIENT)
Dept: SURGERY | Facility: CLINIC | Age: 61
End: 2021-09-09

## 2021-09-09 NOTE — TELEPHONE ENCOUNTER
This RN called wife Olivier Vera in response to her call:     \"Patient's wife is requesting to speak to the nurse about the pt. Having  discomfort at catheter site. \"     Note, this patient has 9/15 scheduled surgery.  S/P cysto, chastity retrograde pyelogram,digital

## 2021-09-10 RX ORDER — TAMSULOSIN HYDROCHLORIDE 0.4 MG/1
0.4 CAPSULE ORAL DAILY
COMMUNITY
End: 2021-11-09 | Stop reason: ALTCHOICE

## 2021-09-11 ENCOUNTER — ANESTHESIA EVENT (OUTPATIENT)
Dept: SURGERY | Facility: HOSPITAL | Age: 61
End: 2021-09-11
Payer: COMMERCIAL

## 2021-09-11 NOTE — H&P
HPI:     Patient returns with his wife and son. He did a cystogram showing no reflux. He brought logs of input and output showing roughly equivalent drainage from each nephrostomy tube. Very little drainage of any from Wolf catheter.   Discussed wit denies chest pain on exertion  GI: denies abdominal pain and denies heartburn  : see HPI  NEURO: no sensory or motor complaint    EXAM:     Ht 5' 8\" (1.727 m)   Wt 170 lb (77.1 kg)   BMI 25.85 kg/m²   GENERAL: well developed, well nourished,in no appare weeks. Rarely are blood transfusions ever required. Similarly when patients have trans urethral prostate surgery there is a rare risk of incontinence postoperatively but this is an extremely rare complication.   Patient 's are instructed to rest for the 1

## 2021-09-12 ENCOUNTER — LAB ENCOUNTER (OUTPATIENT)
Dept: LAB | Facility: HOSPITAL | Age: 61
End: 2021-09-12
Attending: UROLOGY
Payer: COMMERCIAL

## 2021-09-12 DIAGNOSIS — N13.30 HYDRONEPHROSIS, UNSPECIFIED HYDRONEPHROSIS TYPE: ICD-10-CM

## 2021-09-12 DIAGNOSIS — R33.9 URINARY RETENTION: ICD-10-CM

## 2021-09-13 ENCOUNTER — LAB ENCOUNTER (OUTPATIENT)
Dept: LAB | Age: 61
End: 2021-09-13
Attending: INTERNAL MEDICINE
Payer: COMMERCIAL

## 2021-09-13 PROCEDURE — 80048 BASIC METABOLIC PNL TOTAL CA: CPT | Performed by: INTERNAL MEDICINE

## 2021-09-13 PROCEDURE — 36415 COLL VENOUS BLD VENIPUNCTURE: CPT | Performed by: INTERNAL MEDICINE

## 2021-09-14 LAB — SARS-COV-2 RNA RESP QL NAA+PROBE: NOT DETECTED

## 2021-09-15 ENCOUNTER — HOSPITAL ENCOUNTER (OUTPATIENT)
Facility: HOSPITAL | Age: 61
Discharge: HOME | End: 2021-09-16
Attending: UROLOGY | Admitting: UROLOGY
Payer: COMMERCIAL

## 2021-09-15 ENCOUNTER — ANESTHESIA (OUTPATIENT)
Dept: SURGERY | Facility: HOSPITAL | Age: 61
End: 2021-09-15
Payer: COMMERCIAL

## 2021-09-15 DIAGNOSIS — R33.9 URINARY RETENTION: ICD-10-CM

## 2021-09-15 PROCEDURE — 52601 PROSTATECTOMY (TURP): CPT | Performed by: UROLOGY

## 2021-09-15 PROCEDURE — 50389 REMOVE RENAL TUBE W/FLUORO: CPT | Performed by: UROLOGY

## 2021-09-15 PROCEDURE — 99214 OFFICE O/P EST MOD 30 MIN: CPT | Performed by: STUDENT IN AN ORGANIZED HEALTH CARE EDUCATION/TRAINING PROGRAM

## 2021-09-15 PROCEDURE — 0VB08ZZ EXCISION OF PROSTATE, VIA NATURAL OR ARTIFICIAL OPENING ENDOSCOPIC: ICD-10-PCS | Performed by: UROLOGY

## 2021-09-15 RX ORDER — NALOXONE HYDROCHLORIDE 0.4 MG/ML
80 INJECTION, SOLUTION INTRAMUSCULAR; INTRAVENOUS; SUBCUTANEOUS AS NEEDED
Status: DISCONTINUED | OUTPATIENT
Start: 2021-09-15 | End: 2021-09-15 | Stop reason: HOSPADM

## 2021-09-15 RX ORDER — ACETAMINOPHEN 325 MG/1
650 TABLET ORAL
Status: DISCONTINUED | OUTPATIENT
Start: 2021-09-15 | End: 2021-09-16

## 2021-09-15 RX ORDER — PHENYLEPHRINE HCL 10 MG/ML
VIAL (ML) INJECTION AS NEEDED
Status: DISCONTINUED | OUTPATIENT
Start: 2021-09-15 | End: 2021-09-15 | Stop reason: SURG

## 2021-09-15 RX ORDER — OXYCODONE HYDROCHLORIDE 5 MG/1
5 TABLET ORAL EVERY 4 HOURS PRN
Status: DISCONTINUED | OUTPATIENT
Start: 2021-09-15 | End: 2021-09-16

## 2021-09-15 RX ORDER — MIDAZOLAM HYDROCHLORIDE 1 MG/ML
1 INJECTION INTRAMUSCULAR; INTRAVENOUS EVERY 5 MIN PRN
Status: DISCONTINUED | OUTPATIENT
Start: 2021-09-15 | End: 2021-09-15 | Stop reason: HOSPADM

## 2021-09-15 RX ORDER — PHENAZOPYRIDINE HYDROCHLORIDE 100 MG/1
100 TABLET, FILM COATED ORAL 3 TIMES DAILY PRN
Status: DISCONTINUED | OUTPATIENT
Start: 2021-09-15 | End: 2021-09-16

## 2021-09-15 RX ORDER — SODIUM CHLORIDE 9 MG/ML
INJECTION, SOLUTION INTRAVENOUS CONTINUOUS
Status: DISCONTINUED | OUTPATIENT
Start: 2021-09-15 | End: 2021-09-16

## 2021-09-15 RX ORDER — HYDROMORPHONE HYDROCHLORIDE 1 MG/ML
0.4 INJECTION, SOLUTION INTRAMUSCULAR; INTRAVENOUS; SUBCUTANEOUS EVERY 2 HOUR PRN
Status: DISCONTINUED | OUTPATIENT
Start: 2021-09-15 | End: 2021-09-16

## 2021-09-15 RX ORDER — ACETAMINOPHEN 500 MG
1000 TABLET ORAL ONCE
Status: DISCONTINUED | OUTPATIENT
Start: 2021-09-15 | End: 2021-09-15

## 2021-09-15 RX ORDER — HYDROMORPHONE HYDROCHLORIDE 1 MG/ML
0.8 INJECTION, SOLUTION INTRAMUSCULAR; INTRAVENOUS; SUBCUTANEOUS EVERY 2 HOUR PRN
Status: DISCONTINUED | OUTPATIENT
Start: 2021-09-15 | End: 2021-09-16

## 2021-09-15 RX ORDER — SODIUM CHLORIDE, SODIUM LACTATE, POTASSIUM CHLORIDE, CALCIUM CHLORIDE 600; 310; 30; 20 MG/100ML; MG/100ML; MG/100ML; MG/100ML
INJECTION, SOLUTION INTRAVENOUS CONTINUOUS
Status: DISCONTINUED | OUTPATIENT
Start: 2021-09-15 | End: 2021-09-15

## 2021-09-15 RX ORDER — HYDROCODONE BITARTRATE AND ACETAMINOPHEN 10; 325 MG/1; MG/1
1 TABLET ORAL AS NEEDED
Status: DISCONTINUED | OUTPATIENT
Start: 2021-09-15 | End: 2021-09-15 | Stop reason: HOSPADM

## 2021-09-15 RX ORDER — CEFAZOLIN SODIUM/WATER 2 G/20 ML
2 SYRINGE (ML) INTRAVENOUS EVERY 8 HOURS
Status: COMPLETED | OUTPATIENT
Start: 2021-09-15 | End: 2021-09-16

## 2021-09-15 RX ORDER — CEFAZOLIN SODIUM/WATER 2 G/20 ML
2 SYRINGE (ML) INTRAVENOUS ONCE
Status: COMPLETED | OUTPATIENT
Start: 2021-09-15 | End: 2021-09-15

## 2021-09-15 RX ORDER — MIDAZOLAM HYDROCHLORIDE 1 MG/ML
INJECTION INTRAMUSCULAR; INTRAVENOUS AS NEEDED
Status: DISCONTINUED | OUTPATIENT
Start: 2021-09-15 | End: 2021-09-15 | Stop reason: SURG

## 2021-09-15 RX ORDER — MEPERIDINE HYDROCHLORIDE 25 MG/ML
12.5 INJECTION INTRAMUSCULAR; INTRAVENOUS; SUBCUTANEOUS AS NEEDED
Status: DISCONTINUED | OUTPATIENT
Start: 2021-09-15 | End: 2021-09-15 | Stop reason: HOSPADM

## 2021-09-15 RX ORDER — METOCLOPRAMIDE HYDROCHLORIDE 5 MG/ML
10 INJECTION INTRAMUSCULAR; INTRAVENOUS AS NEEDED
Status: DISCONTINUED | OUTPATIENT
Start: 2021-09-15 | End: 2021-09-15 | Stop reason: HOSPADM

## 2021-09-15 RX ORDER — METOPROLOL SUCCINATE 25 MG/1
25 TABLET, EXTENDED RELEASE ORAL 2 TIMES DAILY
Status: DISCONTINUED | OUTPATIENT
Start: 2021-09-15 | End: 2021-09-16

## 2021-09-15 RX ORDER — ACETAMINOPHEN 500 MG
1000 TABLET ORAL ONCE
Status: DISCONTINUED | OUTPATIENT
Start: 2021-09-15 | End: 2021-09-15 | Stop reason: HOSPADM

## 2021-09-15 RX ORDER — GENTAMICIN SULFATE 40 MG/ML
INJECTION, SOLUTION INTRAMUSCULAR; INTRAVENOUS AS NEEDED
Status: DISCONTINUED | OUTPATIENT
Start: 2021-09-15 | End: 2021-09-15

## 2021-09-15 RX ORDER — DEXAMETHASONE SODIUM PHOSPHATE 4 MG/ML
VIAL (ML) INJECTION AS NEEDED
Status: DISCONTINUED | OUTPATIENT
Start: 2021-09-15 | End: 2021-09-15 | Stop reason: SURG

## 2021-09-15 RX ORDER — OXYCODONE HYDROCHLORIDE 10 MG/1
10 TABLET ORAL EVERY 4 HOURS PRN
Status: DISCONTINUED | OUTPATIENT
Start: 2021-09-15 | End: 2021-09-16

## 2021-09-15 RX ORDER — ONDANSETRON 2 MG/ML
4 INJECTION INTRAMUSCULAR; INTRAVENOUS AS NEEDED
Status: DISCONTINUED | OUTPATIENT
Start: 2021-09-15 | End: 2021-09-15 | Stop reason: HOSPADM

## 2021-09-15 RX ORDER — HYDROCODONE BITARTRATE AND ACETAMINOPHEN 10; 325 MG/1; MG/1
2 TABLET ORAL AS NEEDED
Status: DISCONTINUED | OUTPATIENT
Start: 2021-09-15 | End: 2021-09-15 | Stop reason: HOSPADM

## 2021-09-15 RX ORDER — HYDROMORPHONE HYDROCHLORIDE 1 MG/ML
0.4 INJECTION, SOLUTION INTRAMUSCULAR; INTRAVENOUS; SUBCUTANEOUS EVERY 5 MIN PRN
Status: DISCONTINUED | OUTPATIENT
Start: 2021-09-15 | End: 2021-09-15 | Stop reason: HOSPADM

## 2021-09-15 RX ORDER — BUPIVACAINE HYDROCHLORIDE 7.5 MG/ML
INJECTION, SOLUTION INTRASPINAL AS NEEDED
Status: DISCONTINUED | OUTPATIENT
Start: 2021-09-15 | End: 2021-09-15 | Stop reason: SURG

## 2021-09-15 RX ORDER — TAMSULOSIN HYDROCHLORIDE 0.4 MG/1
0.4 CAPSULE ORAL DAILY
Status: DISCONTINUED | OUTPATIENT
Start: 2021-09-15 | End: 2021-09-16

## 2021-09-15 RX ORDER — ATROPA BELLADONNA AND OPIUM 16.2; 6 MG/1; MG/1
1 SUPPOSITORY RECTAL EVERY 6 HOURS PRN
Status: DISCONTINUED | OUTPATIENT
Start: 2021-09-15 | End: 2021-09-16

## 2021-09-15 RX ORDER — METOPROLOL SUCCINATE 25 MG/1
25 TABLET, EXTENDED RELEASE ORAL 2 TIMES DAILY
COMMUNITY

## 2021-09-15 RX ORDER — SODIUM CHLORIDE, SODIUM LACTATE, POTASSIUM CHLORIDE, CALCIUM CHLORIDE 600; 310; 30; 20 MG/100ML; MG/100ML; MG/100ML; MG/100ML
INJECTION, SOLUTION INTRAVENOUS CONTINUOUS
Status: DISCONTINUED | OUTPATIENT
Start: 2021-09-15 | End: 2021-09-15 | Stop reason: HOSPADM

## 2021-09-15 RX ORDER — ONDANSETRON 2 MG/ML
INJECTION INTRAMUSCULAR; INTRAVENOUS AS NEEDED
Status: DISCONTINUED | OUTPATIENT
Start: 2021-09-15 | End: 2021-09-15 | Stop reason: SURG

## 2021-09-15 RX ADMIN — PHENYLEPHRINE HCL 100 MCG: 10 MG/ML VIAL (ML) INJECTION at 08:11:00

## 2021-09-15 RX ADMIN — CEFAZOLIN SODIUM/WATER 2 G: 2 G/20 ML SYRINGE (ML) INTRAVENOUS at 07:53:00

## 2021-09-15 RX ADMIN — PHENYLEPHRINE HCL 100 MCG: 10 MG/ML VIAL (ML) INJECTION at 08:22:00

## 2021-09-15 RX ADMIN — MIDAZOLAM HYDROCHLORIDE 2 MG: 1 INJECTION INTRAMUSCULAR; INTRAVENOUS at 07:37:00

## 2021-09-15 RX ADMIN — SODIUM CHLORIDE, SODIUM LACTATE, POTASSIUM CHLORIDE, CALCIUM CHLORIDE: 600; 310; 30; 20 INJECTION, SOLUTION INTRAVENOUS at 07:35:00

## 2021-09-15 RX ADMIN — ONDANSETRON 4 MG: 2 INJECTION INTRAMUSCULAR; INTRAVENOUS at 08:20:00

## 2021-09-15 RX ADMIN — BUPIVACAINE HYDROCHLORIDE 1.7 ML: 7.5 INJECTION, SOLUTION INTRASPINAL at 07:40:00

## 2021-09-15 RX ADMIN — DEXAMETHASONE SODIUM PHOSPHATE 8 MG: 4 MG/ML VIAL (ML) INJECTION at 08:20:00

## 2021-09-15 NOTE — ANESTHESIA POSTPROCEDURE EVALUATION
[address] Patient Status:  Outpatient in a Bed   Age/Gender 64year old male MRN [id number]   [facility] SURGERY Attending Saige Thrasher MD   Hosp Day # 0 PCP Shivani Arana MD       Anesthesia Post-op Note    CYS

## 2021-09-15 NOTE — OPERATIVE REPORT
Operative Note    Patient Name: Shadia Dias    Date of Procedure: 9/15/2021    Preoperative Diagnosis: Enlarged prostate, urinary retention with chronic kidney disease    Postoperative Diagnosis: Same    Primary Surgeon: Leonard Stafford.  Maricel Guthrie M.D. dorsolithotomy position and the perineum genitalia prepped and draped in usual fashion. Standard universal intraoperative timeout was taken at which time all members of the operative team paused to review the procedure be performed and concurred.   The 22 will be admitted overnight for continuous bladder irrigations and discharged home in the morning with a Wolf catheter. Estimated blood loss was no more than 25 cc. No transfusions are required. Eryn Barreto.  Severo Ritter M.D.

## 2021-09-15 NOTE — ANESTHESIA PREPROCEDURE EVALUATION
PRE-OP EVALUATION    Patient Name: Dylan Christianson    Admit Diagnosis: Urinary retention [R33.9]    Pre-op Diagnosis: Urinary retention [R33.9]    CYSTOSCOPY TRANSURETHRAL RESECTION PROSTATE    Anesthesia Procedure: CYSTOSCOPY TRANSURETHRAL RESECTION PROST standard drinks      Comment: 2 drinks per month      Drug use: Unknown     Available pre-op labs reviewed.   Lab Results   Component Value Date    WBC 7.2 07/19/2021    RBC 4.20 (L) 07/19/2021    HGB 12.5 (L) 07/19/2021    HCT 36.8 (L) 07/19/2021    MCV 87

## 2021-09-15 NOTE — PLAN OF CARE
Pt resting in bed. Orders for bed rest. Alert and oriented x4. Diminished sensation to lower extremities due to spinal anesthesia; able to move legs and feet. Pt denies chest pain, SOB, n/v. Clear liquid diet. IVF infusing per mar.  CBI running at slow to m neutropenic guidelines  Outcome: Progressing     Problem: SAFETY ADULT - FALL  Goal: Free from fall injury  Description: INTERVENTIONS:  - Assess pt frequently for physical needs  - Identify cognitive and physical deficits and behaviors that affect risk of promote bladder emptying  Outcome: Progressing

## 2021-09-15 NOTE — CONSULTS
[facility]  CONSULT     Yanci Jimenez Patient Status:  Outpatient in a Bed    1960 MRN [id number]   [facility] 3NW-A Attending Saige Thrasher MD   Hosp Day # 0 PCP Shivani Arana MD     Reason for consult: Medical Mgt BMI 25.64 kg/m²   General: No acute distress. Alert and oriented x 3. HEENT: Normocephalic atraumatic. Moist mucous membranes. EOM-I. PERRLA. Anicteric. Neck: No lymphadenopathy. No JVD. No carotid bruits.   Respiratory: Clear to auscultation bilaterall full  · Wolf: yes     Plan of care discussed with pt, pt spouse     Gema Baldwin MD  9/15/2021

## 2021-09-15 NOTE — ANESTHESIA PROCEDURE NOTES
Spinal Block  Performed by: Michel Ferrera MD  Authorized by: Michel Ferrera MD       General Information and Staff    Start Time:  9/15/2021 7:40 AM  End Time:  9/15/2021 7:42 AM  Anesthesiologist:  Michel Ferrera MD  Performed by:   Anesthesio

## 2021-09-16 VITALS
WEIGHT: 168.63 LBS | SYSTOLIC BLOOD PRESSURE: 135 MMHG | DIASTOLIC BLOOD PRESSURE: 61 MMHG | BODY MASS INDEX: 25.56 KG/M2 | RESPIRATION RATE: 16 BRPM | TEMPERATURE: 97 F | HEIGHT: 68 IN | OXYGEN SATURATION: 99 % | HEART RATE: 57 BPM

## 2021-09-16 LAB
ANION GAP SERPL CALC-SCNC: 6 MMOL/L (ref 0–18)
BUN BLD-MCNC: 16 MG/DL (ref 7–18)
CALCIUM BLD-MCNC: 8.9 MG/DL (ref 8.5–10.1)
CHLORIDE SERPL-SCNC: 105 MMOL/L (ref 98–112)
CO2 SERPL-SCNC: 24 MMOL/L (ref 21–32)
CREAT BLD-MCNC: 1.98 MG/DL
ERYTHROCYTE [DISTWIDTH] IN BLOOD BY AUTOMATED COUNT: 11.9 %
GLUCOSE BLD-MCNC: 143 MG/DL (ref 70–99)
HCT VFR BLD AUTO: 35.2 %
HGB BLD-MCNC: 11.6 G/DL
MCH RBC QN AUTO: 28.1 PG (ref 26–34)
MCHC RBC AUTO-ENTMCNC: 33 G/DL (ref 31–37)
MCV RBC AUTO: 85.2 FL
OSMOLALITY SERPL CALC.SUM OF ELEC: 284 MOSM/KG (ref 275–295)
PLATELET # BLD AUTO: 287 10(3)UL (ref 150–450)
POTASSIUM SERPL-SCNC: 4.6 MMOL/L (ref 3.5–5.1)
RBC # BLD AUTO: 4.13 X10(6)UL
SODIUM SERPL-SCNC: 135 MMOL/L (ref 136–145)
WBC # BLD AUTO: 13.9 X10(3) UL (ref 4–11)

## 2021-09-16 PROCEDURE — 99214 OFFICE O/P EST MOD 30 MIN: CPT | Performed by: STUDENT IN AN ORGANIZED HEALTH CARE EDUCATION/TRAINING PROGRAM

## 2021-09-16 NOTE — PROGRESS NOTES
NURSING DISCHARGE NOTE    Discharged Home via Ambulatory. Accompanied by Family member  Belongings Taken by patient/family. Pt declined need for wheelchair, preferred to ambulate.  Wolf catheter bag changed to clean standard drainage bag prior to d

## 2021-09-16 NOTE — PLAN OF CARE
Problem: Patient/Family Goals  Goal: Patient/Family Long Term Goal  Description: Patient's Long Term Goal: discharge home    Interventions:  - discontinue CBI  - tolerate diet  - tolerate pain  - See additional Care Plan goals for specific interventions Problem: SAFETY ADULT - FALL  Goal: Free from fall injury  Description: INTERVENTIONS:  - Assess pt frequently for physical needs  - Identify cognitive and physical deficits and behaviors that affect risk of falls.   - [place] fall precautions as indica [Normal] : Oriented to person, place, and time, insight and judgement were intact and the affect was normal urinary retention protocol/standard of care  - Consider collaborating with pharmacy to review patient's medication profile  - Implement strategies to promote bladder emptying  9/16/2021 1115 by Gloria Cortez RN  Outcome: Completed  9/16/2021 1024 by Bahman Doran [FreeTextEntry1] : \par  [de-identified] : No gross deformity, TTP of the lumbar paraspinals bilaterally, ROM limited with extension and oblique extension bilaterally, SLR  negative bilaterally

## 2021-09-16 NOTE — DISCHARGE SUMMARY
[facility]    Discharge Summary    [facility] Patient Status:  Outpatient in a Bed    1960 MRN [id number]   [facility] 3NW-A Attending Leonel Elizabeth MD   Hosp Day # 0 PCP Dimas Cruz MD     Date of Admission:  9/15/20

## 2021-09-16 NOTE — PLAN OF CARE
0630: Pt urine remains clear orange throughout shift. CBI clamped this AM. Pt tolerating well, denies pain. Pt also reports he no longer has decreased sensation in either lower extremity. POC discussed. 0100: Pt VSS, AOx4.  Pt tele on; noted to be SB on Monitor for opioid side effects  - Notify MD/LIP if interventions unsuccessful or patient reports new pain  - Anticipate increased pain with activity and pre-medicate as appropriate  Outcome: Progressing     Problem: RISK FOR INFECTION - ADULT  Goal: Absen support system  Outcome: Progressing     Problem: GENITOURINARY - ADULT  Goal: Absence of urinary retention  Description: INTERVENTIONS:  - Assess patient’s ability to void and empty bladder  - Monitor intake/output and perform bladder scan as needed  - Fo

## 2021-09-16 NOTE — PLAN OF CARE
Pt alert and oriented x4. Tolerating regular diet. Tele, SB-NSR. Metoprolol held. CBI clamped; murray draining clear, orange. Per urology, patient can be discharged today.  Pt will go home with murray catheter, pt has had catheter previously, no education nee Administer growth factors as ordered  - Implement neutropenic guidelines  Outcome: Progressing     Problem: SAFETY ADULT - FALL  Goal: Free from fall injury  Description: INTERVENTIONS:  - Assess pt frequently for physical needs  - Identify cognitive and p medication profile  - Implement strategies to promote bladder emptying  Outcome: Progressing

## 2021-09-16 NOTE — PROGRESS NOTES
[facility] Progress Note     Chandra Luna Patient Status:  Outpatient in a Bed    1960 MRN [id number]   [facility] 3NW-A Attending Modesto Buitrago MD   Hosp Day # 0 PCP Gaby Sullivan MD     Chief Complaint: mg Oral BID   • acetaminophen  650 mg Oral Q4H While awake       Assessment & Plan:      • Urine retention, prostate hyperplasia  • Hypertension   • Bradycardia   o BB held on 9/15, 9/16      Plan of care:   Pt cleared for DC given instructions to resume B

## 2021-09-16 NOTE — PROGRESS NOTES
[facility]    Progress Note    Jarrett Orellana Patient Status:  Outpatient in a Bed    1960 MRN [id number]   [facility] 3NW-A Attending Fidencio Jones MD   Hosp Day # 0 PCP Manuel Manzo MD     Subjective:   Jarrett Orellana i physician assistant.     Duran Amaya MD FACS

## 2021-09-17 ENCOUNTER — TELEPHONE (OUTPATIENT)
Dept: SURGERY | Facility: CLINIC | Age: 61
End: 2021-09-17

## 2021-09-17 ENCOUNTER — NURSE ONLY (OUTPATIENT)
Dept: SURGERY | Facility: CLINIC | Age: 61
End: 2021-09-17
Payer: COMMERCIAL

## 2021-09-17 DIAGNOSIS — R33.9 URINARY RETENTION: Primary | ICD-10-CM

## 2021-09-17 PROCEDURE — 51700 IRRIGATION OF BLADDER: CPT | Performed by: UROLOGY

## 2021-09-17 NOTE — TELEPHONE ENCOUNTER
Spoke to on the phone. Verified name and . Pt was questioning when he can resume his baby aspirin daily. Per Dr. Lory Escobar, he may start it again in a week. Pt also stated that he was able to void.

## 2021-09-17 NOTE — PROGRESS NOTES
Pt here for murray removal S/P TURP. Verified name and . Pt assisted to exam table. 30cc H2O removed from the balloon. Catheter slowly removed without difficulty. Pt tolerated the procedure well.  Instructed to call the office by 2:30 today if unable to v

## 2021-09-24 NOTE — PROGRESS NOTES
Your recent TURP specimen was benign. No evidence of cancer. This is good news. Seems like you are now voiding on your own. Recommend follow up in the office as directed.     Sincerely,  Sneha Lugo MD

## 2021-10-11 ENCOUNTER — OFFICE VISIT (OUTPATIENT)
Dept: SURGERY | Facility: CLINIC | Age: 61
End: 2021-10-11
Payer: COMMERCIAL

## 2021-10-11 DIAGNOSIS — Z87.898 HISTORY OF URINARY RETENTION: ICD-10-CM

## 2021-10-11 DIAGNOSIS — R82.90 URINE FINDING: Primary | ICD-10-CM

## 2021-10-11 DIAGNOSIS — N40.0 ENLARGED PROSTATE: ICD-10-CM

## 2021-10-11 DIAGNOSIS — R97.20 ELEVATED PSA: ICD-10-CM

## 2021-10-11 DIAGNOSIS — N18.32 STAGE 3B CHRONIC KIDNEY DISEASE (HCC): Primary | ICD-10-CM

## 2021-10-11 PROCEDURE — 99024 POSTOP FOLLOW-UP VISIT: CPT | Performed by: UROLOGY

## 2021-10-11 PROCEDURE — 81003 URINALYSIS AUTO W/O SCOPE: CPT | Performed by: UROLOGY

## 2021-10-11 PROCEDURE — 51798 US URINE CAPACITY MEASURE: CPT | Performed by: UROLOGY

## 2021-10-11 NOTE — PROGRESS NOTES
Rooming Clinician:     Sha Anton is a 63year old male. Patient presents with:  Surgical Followup: s/p TURP 9/15/21. nocturia x 1 around 4 am        HPI:     Patient returns for follow-up visit he had a transurethral prostatectomy on September 15. nourished,in no apparent distress  SKIN: no rashes,no suspicious lesions  HEENT: atraumatic, normocephalic,ears and throat are clear  NECK: supple  LUNGS: normal respiratory motion without distress  CARDIO: normal peripheral perfusion  BACK: no CVA tendern

## 2021-11-01 DIAGNOSIS — N18.32 STAGE 3B CHRONIC KIDNEY DISEASE (HCC): Primary | ICD-10-CM

## 2021-11-08 ENCOUNTER — LAB ENCOUNTER (OUTPATIENT)
Dept: LAB | Age: 61
End: 2021-11-08
Attending: INTERNAL MEDICINE

## 2021-11-08 PROCEDURE — 36415 COLL VENOUS BLD VENIPUNCTURE: CPT | Performed by: INTERNAL MEDICINE

## 2021-11-08 PROCEDURE — 80048 BASIC METABOLIC PNL TOTAL CA: CPT | Performed by: INTERNAL MEDICINE

## 2021-11-09 ENCOUNTER — OFFICE VISIT (OUTPATIENT)
Dept: NEPHROLOGY | Facility: CLINIC | Age: 61
End: 2021-11-09
Payer: COMMERCIAL

## 2021-11-09 VITALS — DIASTOLIC BLOOD PRESSURE: 76 MMHG | BODY MASS INDEX: 27 KG/M2 | SYSTOLIC BLOOD PRESSURE: 112 MMHG | WEIGHT: 180 LBS

## 2021-11-09 DIAGNOSIS — N18.30 STAGE 3 CHRONIC KIDNEY DISEASE, UNSPECIFIED WHETHER STAGE 3A OR 3B CKD (HCC): Primary | ICD-10-CM

## 2021-11-09 PROCEDURE — 3074F SYST BP LT 130 MM HG: CPT | Performed by: INTERNAL MEDICINE

## 2021-11-09 PROCEDURE — 99215 OFFICE O/P EST HI 40 MIN: CPT | Performed by: INTERNAL MEDICINE

## 2021-11-09 PROCEDURE — 3078F DIAST BP <80 MM HG: CPT | Performed by: INTERNAL MEDICINE

## 2021-11-09 NOTE — PROGRESS NOTES
Nephrology Consult Note      REASON FOR CONSULT:  RAYMOND         HPI:   Micki Montemayor is a 63year old male with No chief complaint on file.     Tejal Salguero MD    62 y/o male with no sig PMH who was noted to have abnormal renal function on routine labs an There were no vitals taken for this visit.   Wt Readings from Last 6 Encounters:  09/15/21 : 168 lb 10.4 oz (76.5 kg)  07/23/21 : 180 lb (81.6 kg)  07/21/21 : 180 lb 4 oz (81.8 kg)  07/16/21 : 187 lb 12.8 oz (85.2 kg)  07/08/21 : 195 lb (88.5 kg)    Gener

## 2021-11-17 ENCOUNTER — TELEPHONE (OUTPATIENT)
Dept: NEPHROLOGY | Facility: CLINIC | Age: 61
End: 2021-11-17

## 2021-11-17 DIAGNOSIS — I10 PRIMARY HYPERTENSION: Primary | ICD-10-CM

## 2021-11-17 RX ORDER — LOSARTAN POTASSIUM 25 MG/1
25 TABLET ORAL DAILY
Qty: 30 TABLET | Refills: 0 | Status: SHIPPED | OUTPATIENT
Start: 2021-11-17 | End: 2021-12-17

## 2021-11-30 ENCOUNTER — LAB ENCOUNTER (OUTPATIENT)
Dept: LAB | Age: 61
End: 2021-11-30
Attending: INTERNAL MEDICINE
Payer: COMMERCIAL

## 2021-11-30 DIAGNOSIS — N18.30 STAGE 3 CHRONIC KIDNEY DISEASE, UNSPECIFIED WHETHER STAGE 3A OR 3B CKD (HCC): ICD-10-CM

## 2021-11-30 PROCEDURE — 83970 ASSAY OF PARATHORMONE: CPT | Performed by: INTERNAL MEDICINE

## 2021-11-30 PROCEDURE — 85025 COMPLETE CBC W/AUTO DIFF WBC: CPT | Performed by: INTERNAL MEDICINE

## 2021-11-30 PROCEDURE — 36415 COLL VENOUS BLD VENIPUNCTURE: CPT | Performed by: INTERNAL MEDICINE

## 2021-11-30 PROCEDURE — 84156 ASSAY OF PROTEIN URINE: CPT | Performed by: INTERNAL MEDICINE

## 2021-11-30 PROCEDURE — 83735 ASSAY OF MAGNESIUM: CPT | Performed by: INTERNAL MEDICINE

## 2021-11-30 PROCEDURE — 82306 VITAMIN D 25 HYDROXY: CPT

## 2021-11-30 PROCEDURE — 81001 URINALYSIS AUTO W/SCOPE: CPT | Performed by: INTERNAL MEDICINE

## 2021-11-30 PROCEDURE — 80048 BASIC METABOLIC PNL TOTAL CA: CPT | Performed by: INTERNAL MEDICINE

## 2021-11-30 PROCEDURE — 82570 ASSAY OF URINE CREATININE: CPT | Performed by: INTERNAL MEDICINE

## 2021-12-01 ENCOUNTER — TELEPHONE (OUTPATIENT)
Dept: NEPHROLOGY | Facility: CLINIC | Age: 61
End: 2021-12-01

## 2021-12-01 RX ORDER — ERGOCALCIFEROL (VITAMIN D2) 1250 MCG
CAPSULE ORAL
Qty: 12 CAPSULE | Refills: 0 | Status: SHIPPED | OUTPATIENT
Start: 2021-12-01

## 2021-12-22 RX ORDER — LOSARTAN POTASSIUM 25 MG/1
25 TABLET ORAL DAILY
Qty: 90 TABLET | Refills: 1 | Status: SHIPPED | OUTPATIENT
Start: 2021-12-22 | End: 2022-01-21

## 2022-03-22 ENCOUNTER — PATIENT MESSAGE (OUTPATIENT)
Dept: SURGERY | Facility: CLINIC | Age: 62
End: 2022-03-22

## 2022-03-23 ENCOUNTER — HOSPITAL ENCOUNTER (OUTPATIENT)
Dept: ULTRASOUND IMAGING | Facility: HOSPITAL | Age: 62
Discharge: HOME | End: 2022-03-23
Attending: NURSE PRACTITIONER
Payer: COMMERCIAL

## 2022-03-23 ENCOUNTER — LAB ENCOUNTER (OUTPATIENT)
Dept: LAB | Facility: HOSPITAL | Age: 62
End: 2022-03-23
Attending: NURSE PRACTITIONER
Payer: COMMERCIAL

## 2022-03-23 DIAGNOSIS — R10.9 ACUTE LEFT FLANK PAIN: ICD-10-CM

## 2022-03-23 DIAGNOSIS — R31.0 GROSS HEMATURIA: ICD-10-CM

## 2022-03-23 DIAGNOSIS — R82.90 URINE FINDING: ICD-10-CM

## 2022-03-23 LAB
BILIRUB UR QL STRIP.AUTO: NEGATIVE
GLUCOSE UR STRIP.AUTO-MCNC: NEGATIVE MG/DL
KETONES UR STRIP.AUTO-MCNC: NEGATIVE MG/DL
NITRITE UR QL STRIP.AUTO: NEGATIVE
PH UR STRIP.AUTO: 6 [PH] (ref 5–8)
PROT UR STRIP.AUTO-MCNC: NEGATIVE MG/DL
SP GR UR STRIP.AUTO: 1 (ref 1–1.03)
UROBILINOGEN UR STRIP.AUTO-MCNC: <2 MG/DL

## 2022-03-23 PROCEDURE — 80053 COMPREHEN METABOLIC PANEL: CPT | Performed by: NURSE PRACTITIONER

## 2022-03-23 PROCEDURE — 87086 URINE CULTURE/COLONY COUNT: CPT

## 2022-03-23 PROCEDURE — 81001 URINALYSIS AUTO W/SCOPE: CPT

## 2022-03-23 PROCEDURE — 76770 US EXAM ABDO BACK WALL COMP: CPT | Performed by: NURSE PRACTITIONER

## 2022-03-23 PROCEDURE — 87077 CULTURE AEROBIC IDENTIFY: CPT

## 2022-03-23 PROCEDURE — 85025 COMPLETE CBC W/AUTO DIFF WBC: CPT | Performed by: NURSE PRACTITIONER

## 2022-03-23 PROCEDURE — 36415 COLL VENOUS BLD VENIPUNCTURE: CPT | Performed by: NURSE PRACTITIONER

## 2022-03-23 NOTE — TELEPHONE ENCOUNTER
From: Ghulam Bosch  To: Jovana Olmedo MD  Sent: 3/22/2022 8:34 PM CDT  Subject: Dark color urine, traveling [place] on [place]25 ,     2 days ago I fell down - had pain on my right side - muscular pain in my back. Have been eating blackberries, ac, grapes- good bowel movement (on softer side) yesterday & today. Today the pain was on left side of the back. Early afternoon, pain travelled to the front pelvic area- pain unbearable for about 1 1/2 hr.   Went to the bathroom couple of times, feeling very full - induced vomiting. Felt relieved after passing gas - slept for 2 hrs. Urine color brown the 1st time. Urine was slightly bloody with a little debris 2nd & 3rd time. Urine is clear - 4th time. Otherwise I am feeling fine with no pain . [place]ing [place] on [place]25 for 9 days. Would that be okay?  Thanks   Regards   Yomi Maria

## 2022-03-23 NOTE — TELEPHONE ENCOUNTER
RN replied via [address] [place] [address] [phone]. Order of UA/ Cx placed. RN referred patient to call PCP as well.

## 2022-03-24 ENCOUNTER — MED REC SCAN ONLY (OUTPATIENT)
Dept: NEPHROLOGY | Facility: CLINIC | Age: 62
End: 2022-03-24

## 2022-03-29 ENCOUNTER — TELEPHONE (OUTPATIENT)
Dept: SURGERY | Facility: CLINIC | Age: 62
End: 2022-03-29

## 2022-06-01 ENCOUNTER — TELEPHONE (OUTPATIENT)
Dept: NEPHROLOGY | Facility: CLINIC | Age: 62
End: 2022-06-01

## 2022-06-01 DIAGNOSIS — N18.32 STAGE 3B CHRONIC KIDNEY DISEASE (HCC): Primary | ICD-10-CM

## 2022-06-01 NOTE — TELEPHONE ENCOUNTER
Patient has appointment on 6/7. Would Dr. Dominick Patino place orders for labs so he can get them done on 6/4? Please notify patient when orders are placed.

## 2022-06-04 ENCOUNTER — LAB ENCOUNTER (OUTPATIENT)
Dept: LAB | Facility: HOSPITAL | Age: 62
End: 2022-06-04
Attending: INTERNAL MEDICINE
Payer: COMMERCIAL

## 2022-06-04 DIAGNOSIS — N18.32 STAGE 3B CHRONIC KIDNEY DISEASE (HCC): ICD-10-CM

## 2022-06-04 LAB
ANION GAP SERPL CALC-SCNC: 6 MMOL/L (ref 0–18)
BASOPHILS # BLD AUTO: 0.05 X10(3) UL (ref 0–0.2)
BASOPHILS NFR BLD AUTO: 0.8 %
BILIRUB UR QL STRIP.AUTO: NEGATIVE
BUN BLD-MCNC: 24 MG/DL (ref 7–18)
CALCIUM BLD-MCNC: 9.2 MG/DL (ref 8.5–10.1)
CHLORIDE SERPL-SCNC: 107 MMOL/L (ref 98–112)
CLARITY UR REFRACT.AUTO: CLEAR
CO2 SERPL-SCNC: 26 MMOL/L (ref 21–32)
COLOR UR AUTO: YELLOW
CREAT BLD-MCNC: 2.02 MG/DL
CREAT UR-SCNC: 38.6 MG/DL
EOSINOPHIL # BLD AUTO: 0.18 X10(3) UL (ref 0–0.7)
EOSINOPHIL NFR BLD AUTO: 2.9 %
ERYTHROCYTE [DISTWIDTH] IN BLOOD BY AUTOMATED COUNT: 12.8 %
FASTING STATUS PATIENT QL REPORTED: NO
GLUCOSE BLD-MCNC: 109 MG/DL (ref 70–99)
GLUCOSE UR STRIP.AUTO-MCNC: NEGATIVE MG/DL
HCT VFR BLD AUTO: 43.6 %
HGB BLD-MCNC: 14.2 G/DL
IMM GRANULOCYTES # BLD AUTO: 0.02 X10(3) UL (ref 0–1)
IMM GRANULOCYTES NFR BLD: 0.3 %
KETONES UR STRIP.AUTO-MCNC: NEGATIVE MG/DL
LYMPHOCYTES # BLD AUTO: 1.98 X10(3) UL (ref 1–4)
LYMPHOCYTES NFR BLD AUTO: 32 %
MAGNESIUM SERPL-MCNC: 2.4 MG/DL (ref 1.6–2.6)
MCH RBC QN AUTO: 29.4 PG (ref 26–34)
MCHC RBC AUTO-ENTMCNC: 32.6 G/DL (ref 31–37)
MCV RBC AUTO: 90.3 FL
MONOCYTES # BLD AUTO: 0.5 X10(3) UL (ref 0.1–1)
MONOCYTES NFR BLD AUTO: 8.1 %
NEUTROPHILS # BLD AUTO: 3.46 X10 (3) UL (ref 1.5–7.7)
NEUTROPHILS # BLD AUTO: 3.46 X10(3) UL (ref 1.5–7.7)
NEUTROPHILS NFR BLD AUTO: 55.9 %
NITRITE UR QL STRIP.AUTO: NEGATIVE
OSMOLALITY SERPL CALC.SUM OF ELEC: 293 MOSM/KG (ref 275–295)
PH UR STRIP.AUTO: 6 [PH] (ref 5–8)
PLATELET # BLD AUTO: 193 10(3)UL (ref 150–450)
POTASSIUM SERPL-SCNC: 4.5 MMOL/L (ref 3.5–5.1)
PROT UR STRIP.AUTO-MCNC: NEGATIVE MG/DL
PROT UR-MCNC: 9.8 MG/DL
PTH-INTACT SERPL-MCNC: 129.2 PG/ML (ref 18.5–88)
RBC # BLD AUTO: 4.83 X10(6)UL
RBC UR QL AUTO: NEGATIVE
SODIUM SERPL-SCNC: 139 MMOL/L (ref 136–145)
SP GR UR STRIP.AUTO: 1.01 (ref 1–1.03)
UROBILINOGEN UR STRIP.AUTO-MCNC: 0.2 MG/DL
VIT D+METAB SERPL-MCNC: 26.1 NG/ML (ref 30–100)
WBC # BLD AUTO: 6.2 X10(3) UL (ref 4–11)

## 2022-06-04 PROCEDURE — 83735 ASSAY OF MAGNESIUM: CPT

## 2022-06-04 PROCEDURE — 83970 ASSAY OF PARATHORMONE: CPT

## 2022-06-04 PROCEDURE — 84156 ASSAY OF PROTEIN URINE: CPT | Performed by: INTERNAL MEDICINE

## 2022-06-04 PROCEDURE — 82306 VITAMIN D 25 HYDROXY: CPT

## 2022-06-04 PROCEDURE — 36415 COLL VENOUS BLD VENIPUNCTURE: CPT

## 2022-06-04 PROCEDURE — 85025 COMPLETE CBC W/AUTO DIFF WBC: CPT

## 2022-06-04 PROCEDURE — 81001 URINALYSIS AUTO W/SCOPE: CPT

## 2022-06-04 PROCEDURE — 80048 BASIC METABOLIC PNL TOTAL CA: CPT

## 2022-06-04 PROCEDURE — 82570 ASSAY OF URINE CREATININE: CPT

## 2022-06-07 ENCOUNTER — OFFICE VISIT (OUTPATIENT)
Dept: NEPHROLOGY | Facility: CLINIC | Age: 62
End: 2022-06-07
Payer: COMMERCIAL

## 2022-06-07 VITALS — DIASTOLIC BLOOD PRESSURE: 74 MMHG | BODY MASS INDEX: 28 KG/M2 | SYSTOLIC BLOOD PRESSURE: 112 MMHG | WEIGHT: 181 LBS

## 2022-06-07 DIAGNOSIS — R82.81 PYURIA: Primary | ICD-10-CM

## 2022-06-07 PROCEDURE — 3074F SYST BP LT 130 MM HG: CPT | Performed by: INTERNAL MEDICINE

## 2022-06-07 PROCEDURE — 99213 OFFICE O/P EST LOW 20 MIN: CPT | Performed by: INTERNAL MEDICINE

## 2022-06-07 PROCEDURE — 3078F DIAST BP <80 MM HG: CPT | Performed by: INTERNAL MEDICINE

## 2022-06-07 RX ORDER — LOSARTAN POTASSIUM 25 MG/1
25 TABLET ORAL DAILY
COMMUNITY

## 2022-06-24 ENCOUNTER — OFFICE VISIT (OUTPATIENT)
Dept: SURGERY | Facility: CLINIC | Age: 62
End: 2022-06-24
Payer: COMMERCIAL

## 2022-06-24 DIAGNOSIS — Z87.898 HISTORY OF URINARY RETENTION: ICD-10-CM

## 2022-06-24 DIAGNOSIS — N40.0 ENLARGED PROSTATE: ICD-10-CM

## 2022-06-24 DIAGNOSIS — R82.90 URINE FINDING: Primary | ICD-10-CM

## 2022-06-24 DIAGNOSIS — R97.20 ELEVATED PSA: ICD-10-CM

## 2022-06-24 LAB
APPEARANCE: CLEAR
BILIRUBIN: NEGATIVE
GLUCOSE (URINE DIPSTICK): NEGATIVE MG/DL
KETONES (URINE DIPSTICK): NEGATIVE MG/DL
MULTISTIX LOT#: ABNORMAL NUMERIC
NITRITE, URINE: NEGATIVE
OCCULT BLOOD: NEGATIVE
PH, URINE: 5.5 (ref 4.5–8)
PROTEIN (URINE DIPSTICK): NEGATIVE MG/DL
SPECIFIC GRAVITY: <=1.005 (ref 1–1.03)
URINE-COLOR: YELLOW
UROBILINOGEN,SEMI-QN: 0.2 MG/DL (ref 0–1.9)

## 2022-06-24 PROCEDURE — 99213 OFFICE O/P EST LOW 20 MIN: CPT | Performed by: UROLOGY

## 2022-06-24 PROCEDURE — 81003 URINALYSIS AUTO W/O SCOPE: CPT | Performed by: UROLOGY

## 2022-11-08 ENCOUNTER — TELEPHONE (OUTPATIENT)
Dept: NEPHROLOGY | Facility: CLINIC | Age: 62
End: 2022-11-08

## 2022-11-08 NOTE — TELEPHONE ENCOUNTER
Patient said he has head congestion and would like to know what medication is safe for his kidneys to take.

## 2023-05-01 ENCOUNTER — LAB ENCOUNTER (OUTPATIENT)
Dept: LAB | Age: 63
End: 2023-05-01
Attending: NURSE PRACTITIONER
Payer: COMMERCIAL

## 2023-05-01 DIAGNOSIS — E11.8 DIABETIC COMPLICATION (HCC): ICD-10-CM

## 2023-05-01 DIAGNOSIS — N13.8 HYPERTROPHY OF PROSTATE WITH URINARY OBSTRUCTION: Primary | ICD-10-CM

## 2023-05-01 DIAGNOSIS — E78.00 PURE HYPERCHOLESTEROLEMIA: ICD-10-CM

## 2023-05-01 DIAGNOSIS — N40.1 HYPERTROPHY OF PROSTATE WITH URINARY OBSTRUCTION: Primary | ICD-10-CM

## 2023-05-01 DIAGNOSIS — Z12.5 SPECIAL SCREENING FOR MALIGNANT NEOPLASM OF PROSTATE: ICD-10-CM

## 2023-05-01 DIAGNOSIS — E11.9 DIABETES MELLITUS (HCC): ICD-10-CM

## 2023-05-01 DIAGNOSIS — Z00.00 ROUTINE GENERAL MEDICAL EXAMINATION AT A HEALTH CARE FACILITY: ICD-10-CM

## 2023-05-01 LAB
ALBUMIN SERPL-MCNC: 4.1 G/DL (ref 3.4–5)
ALBUMIN/GLOB SERPL: 1.1 {RATIO} (ref 1–2)
ALP LIVER SERPL-CCNC: 128 U/L
ALT SERPL-CCNC: 22 U/L
ANION GAP SERPL CALC-SCNC: 5 MMOL/L (ref 0–18)
AST SERPL-CCNC: 21 U/L (ref 15–37)
BASOPHILS # BLD AUTO: 0.06 X10(3) UL (ref 0–0.2)
BASOPHILS NFR BLD AUTO: 1.1 %
BILIRUB SERPL-MCNC: 0.6 MG/DL (ref 0.1–2)
BUN BLD-MCNC: 26 MG/DL (ref 7–18)
CALCIUM BLD-MCNC: 9 MG/DL (ref 8.5–10.1)
CHLORIDE SERPL-SCNC: 109 MMOL/L (ref 98–112)
CHOLEST SERPL-MCNC: 222 MG/DL (ref ?–200)
CO2 SERPL-SCNC: 25 MMOL/L (ref 21–32)
CREAT BLD-MCNC: 2 MG/DL
EOSINOPHIL # BLD AUTO: 0.22 X10(3) UL (ref 0–0.7)
EOSINOPHIL NFR BLD AUTO: 4 %
ERYTHROCYTE [DISTWIDTH] IN BLOOD BY AUTOMATED COUNT: 12.8 %
EST. AVERAGE GLUCOSE BLD GHB EST-MCNC: 128 MG/DL (ref 68–126)
FASTING PATIENT LIPID ANSWER: YES
FASTING STATUS PATIENT QL REPORTED: YES
GFR SERPLBLD BASED ON 1.73 SQ M-ARVRAT: 38 ML/MIN/1.73M2 (ref 60–?)
GLOBULIN PLAS-MCNC: 3.8 G/DL (ref 2.8–4.4)
GLUCOSE BLD-MCNC: 98 MG/DL (ref 70–99)
HBA1C MFR BLD: 6.1 % (ref ?–5.7)
HCT VFR BLD AUTO: 49.4 %
HDLC SERPL-MCNC: 44 MG/DL (ref 40–59)
HGB BLD-MCNC: 15.8 G/DL
IMM GRANULOCYTES # BLD AUTO: 0.01 X10(3) UL (ref 0–1)
IMM GRANULOCYTES NFR BLD: 0.2 %
LDLC SERPL CALC-MCNC: 130 MG/DL (ref ?–100)
LYMPHOCYTES # BLD AUTO: 2.07 X10(3) UL (ref 1–4)
LYMPHOCYTES NFR BLD AUTO: 37.8 %
MCH RBC QN AUTO: 29.7 PG (ref 26–34)
MCHC RBC AUTO-ENTMCNC: 32 G/DL (ref 31–37)
MCV RBC AUTO: 92.9 FL
MONOCYTES # BLD AUTO: 0.51 X10(3) UL (ref 0.1–1)
MONOCYTES NFR BLD AUTO: 9.3 %
NEUTROPHILS # BLD AUTO: 2.6 X10 (3) UL (ref 1.5–7.7)
NEUTROPHILS # BLD AUTO: 2.6 X10(3) UL (ref 1.5–7.7)
NEUTROPHILS NFR BLD AUTO: 47.6 %
NONHDLC SERPL-MCNC: 178 MG/DL (ref ?–130)
OSMOLALITY SERPL CALC.SUM OF ELEC: 293 MOSM/KG (ref 275–295)
PLATELET # BLD AUTO: 214 10(3)UL (ref 150–450)
POTASSIUM SERPL-SCNC: 4.8 MMOL/L (ref 3.5–5.1)
PROT SERPL-MCNC: 7.9 G/DL (ref 6.4–8.2)
PSA FREE MFR SERPL: 15 %
PSA FREE SERPL-MCNC: 0.26 NG/ML
PSA SERPL-MCNC: 1.78 NG/ML (ref ?–4)
RBC # BLD AUTO: 5.32 X10(6)UL
SODIUM SERPL-SCNC: 139 MMOL/L (ref 136–145)
T4 FREE SERPL-MCNC: 0.9 NG/DL (ref 0.8–1.7)
TRIGL SERPL-MCNC: 272 MG/DL (ref 30–149)
TSI SER-ACNC: 4.74 MIU/ML (ref 0.36–3.74)
VIT D+METAB SERPL-MCNC: 29.1 NG/ML (ref 30–100)
VLDLC SERPL CALC-MCNC: 50 MG/DL (ref 0–30)
WBC # BLD AUTO: 5.5 X10(3) UL (ref 4–11)

## 2023-05-01 PROCEDURE — 84154 ASSAY OF PSA FREE: CPT

## 2023-05-01 PROCEDURE — 80053 COMPREHEN METABOLIC PANEL: CPT

## 2023-05-01 PROCEDURE — 85025 COMPLETE CBC W/AUTO DIFF WBC: CPT

## 2023-05-01 PROCEDURE — 36415 COLL VENOUS BLD VENIPUNCTURE: CPT

## 2023-05-01 PROCEDURE — 84443 ASSAY THYROID STIM HORMONE: CPT

## 2023-05-01 PROCEDURE — 83036 HEMOGLOBIN GLYCOSYLATED A1C: CPT

## 2023-05-01 PROCEDURE — 82306 VITAMIN D 25 HYDROXY: CPT

## 2023-05-01 PROCEDURE — 80061 LIPID PANEL: CPT

## 2023-05-01 PROCEDURE — 84153 ASSAY OF PSA TOTAL: CPT

## 2023-05-01 PROCEDURE — 84439 ASSAY OF FREE THYROXINE: CPT

## 2023-05-01 PROCEDURE — 84403 ASSAY OF TOTAL TESTOSTERONE: CPT

## 2023-05-01 PROCEDURE — 84402 ASSAY OF FREE TESTOSTERONE: CPT

## 2023-05-06 LAB
% FREE TESTOST: 2.34 %
TESTOST FREE: 11.04 NG/DL
TESTOSTERONE TOT /MS: 471.9 NG/DL

## 2023-07-11 DIAGNOSIS — N18.30 STAGE 3 CHRONIC KIDNEY DISEASE, UNSPECIFIED WHETHER STAGE 3A OR 3B CKD (HCC): ICD-10-CM

## 2023-07-11 DIAGNOSIS — I10 PRIMARY HYPERTENSION: Primary | ICD-10-CM

## 2023-07-15 ENCOUNTER — LAB ENCOUNTER (OUTPATIENT)
Dept: LAB | Age: 63
End: 2023-07-15
Attending: INTERNAL MEDICINE
Payer: COMMERCIAL

## 2023-07-15 ENCOUNTER — LAB ENCOUNTER (OUTPATIENT)
Dept: LAB | Age: 63
End: 2023-07-15
Attending: NURSE PRACTITIONER
Payer: COMMERCIAL

## 2023-07-15 DIAGNOSIS — R94.6 NONSPECIFIC ABNORMAL RESULTS OF THYROID FUNCTION STUDY: Primary | ICD-10-CM

## 2023-07-15 DIAGNOSIS — I10 PRIMARY HYPERTENSION: ICD-10-CM

## 2023-07-15 DIAGNOSIS — N18.30 STAGE 3 CHRONIC KIDNEY DISEASE, UNSPECIFIED WHETHER STAGE 3A OR 3B CKD (HCC): ICD-10-CM

## 2023-07-15 LAB
ALBUMIN SERPL-MCNC: 3.9 G/DL (ref 3.4–5)
ALBUMIN/GLOB SERPL: 1 {RATIO} (ref 1–2)
ALP LIVER SERPL-CCNC: 122 U/L
ALT SERPL-CCNC: 29 U/L
ANION GAP SERPL CALC-SCNC: 2 MMOL/L (ref 0–18)
AST SERPL-CCNC: 23 U/L (ref 15–37)
BASOPHILS # BLD AUTO: 0.04 X10(3) UL (ref 0–0.2)
BASOPHILS NFR BLD AUTO: 0.6 %
BILIRUB SERPL-MCNC: 0.8 MG/DL (ref 0.1–2)
BILIRUB UR QL STRIP.AUTO: NEGATIVE
BUN BLD-MCNC: 24 MG/DL (ref 7–18)
CALCIUM BLD-MCNC: 9.2 MG/DL (ref 8.5–10.1)
CHLORIDE SERPL-SCNC: 108 MMOL/L (ref 98–112)
CLARITY UR REFRACT.AUTO: CLEAR
CO2 SERPL-SCNC: 28 MMOL/L (ref 21–32)
COLOR UR AUTO: YELLOW
CREAT BLD-MCNC: 2.17 MG/DL
CREAT UR-SCNC: 75 MG/DL
EOSINOPHIL # BLD AUTO: 0.19 X10(3) UL (ref 0–0.7)
EOSINOPHIL NFR BLD AUTO: 2.9 %
ERYTHROCYTE [DISTWIDTH] IN BLOOD BY AUTOMATED COUNT: 12.8 %
FASTING STATUS PATIENT QL REPORTED: YES
GFR SERPLBLD BASED ON 1.73 SQ M-ARVRAT: 34 ML/MIN/1.73M2 (ref 60–?)
GLOBULIN PLAS-MCNC: 3.9 G/DL (ref 2.8–4.4)
GLUCOSE BLD-MCNC: 97 MG/DL (ref 70–99)
GLUCOSE UR STRIP.AUTO-MCNC: NEGATIVE MG/DL
HCT VFR BLD AUTO: 47.1 %
HGB BLD-MCNC: 15.5 G/DL
IMM GRANULOCYTES # BLD AUTO: 0.02 X10(3) UL (ref 0–1)
IMM GRANULOCYTES NFR BLD: 0.3 %
KETONES UR STRIP.AUTO-MCNC: NEGATIVE MG/DL
LYMPHOCYTES # BLD AUTO: 2.35 X10(3) UL (ref 1–4)
LYMPHOCYTES NFR BLD AUTO: 35.4 %
MAGNESIUM SERPL-MCNC: 2.5 MG/DL (ref 1.6–2.6)
MCH RBC QN AUTO: 28.9 PG (ref 26–34)
MCHC RBC AUTO-ENTMCNC: 32.9 G/DL (ref 31–37)
MCV RBC AUTO: 87.9 FL
MONOCYTES # BLD AUTO: 0.67 X10(3) UL (ref 0.1–1)
MONOCYTES NFR BLD AUTO: 10.1 %
NEUTROPHILS # BLD AUTO: 3.36 X10 (3) UL (ref 1.5–7.7)
NEUTROPHILS # BLD AUTO: 3.36 X10(3) UL (ref 1.5–7.7)
NEUTROPHILS NFR BLD AUTO: 50.7 %
NITRITE UR QL STRIP.AUTO: POSITIVE
OSMOLALITY SERPL CALC.SUM OF ELEC: 290 MOSM/KG (ref 275–295)
PH UR STRIP.AUTO: 6 [PH] (ref 5–8)
PLATELET # BLD AUTO: 210 10(3)UL (ref 150–450)
POTASSIUM SERPL-SCNC: 4.8 MMOL/L (ref 3.5–5.1)
PROT SERPL-MCNC: 7.8 G/DL (ref 6.4–8.2)
PROT UR STRIP.AUTO-MCNC: NEGATIVE MG/DL
PROT UR-MCNC: 18.7 MG/DL
PTH-INTACT SERPL-MCNC: 144.1 PG/ML (ref 18.5–88)
RBC # BLD AUTO: 5.36 X10(6)UL
RBC UR QL AUTO: NEGATIVE
SODIUM SERPL-SCNC: 138 MMOL/L (ref 136–145)
SP GR UR STRIP.AUTO: 1.01 (ref 1–1.03)
T3FREE SERPL-MCNC: 2.55 PG/ML (ref 2.4–4.2)
T4 FREE SERPL-MCNC: 1 NG/DL (ref 0.8–1.7)
TSI SER-ACNC: 3.66 MIU/ML (ref 0.36–3.74)
UROBILINOGEN UR STRIP.AUTO-MCNC: <2 MG/DL
WBC # BLD AUTO: 6.6 X10(3) UL (ref 4–11)
WBC #/AREA URNS AUTO: >50 /HPF
WBC CLUMPS UR QL AUTO: PRESENT /HPF
WBC CLUMPS UR QL AUTO: PRESENT /HPF

## 2023-07-15 PROCEDURE — 81001 URINALYSIS AUTO W/SCOPE: CPT | Performed by: INTERNAL MEDICINE

## 2023-07-15 PROCEDURE — 82570 ASSAY OF URINE CREATININE: CPT | Performed by: INTERNAL MEDICINE

## 2023-07-15 PROCEDURE — 83970 ASSAY OF PARATHORMONE: CPT | Performed by: INTERNAL MEDICINE

## 2023-07-15 PROCEDURE — 84156 ASSAY OF PROTEIN URINE: CPT | Performed by: INTERNAL MEDICINE

## 2023-07-15 PROCEDURE — 85025 COMPLETE CBC W/AUTO DIFF WBC: CPT | Performed by: INTERNAL MEDICINE

## 2023-07-15 PROCEDURE — 83735 ASSAY OF MAGNESIUM: CPT | Performed by: INTERNAL MEDICINE

## 2023-07-17 ENCOUNTER — LAB ENCOUNTER (OUTPATIENT)
Dept: LAB | Age: 63
End: 2023-07-17
Attending: INTERNAL MEDICINE
Payer: COMMERCIAL

## 2023-07-17 DIAGNOSIS — R82.81 PYURIA: Primary | ICD-10-CM

## 2023-07-17 DIAGNOSIS — R82.81 PYURIA: ICD-10-CM

## 2023-07-17 PROCEDURE — 87086 URINE CULTURE/COLONY COUNT: CPT | Performed by: INTERNAL MEDICINE

## 2023-07-28 ENCOUNTER — OFFICE VISIT (OUTPATIENT)
Dept: NEPHROLOGY | Facility: CLINIC | Age: 63
End: 2023-07-28
Payer: COMMERCIAL

## 2023-07-28 VITALS — DIASTOLIC BLOOD PRESSURE: 64 MMHG | WEIGHT: 189.38 LBS | BODY MASS INDEX: 29 KG/M2 | SYSTOLIC BLOOD PRESSURE: 110 MMHG

## 2023-07-28 DIAGNOSIS — N28.9 RENAL INSUFFICIENCY: Primary | ICD-10-CM

## 2023-07-28 PROCEDURE — 99214 OFFICE O/P EST MOD 30 MIN: CPT | Performed by: INTERNAL MEDICINE

## 2023-08-01 ENCOUNTER — HOSPITAL ENCOUNTER (OUTPATIENT)
Dept: ULTRASOUND IMAGING | Age: 63
Discharge: HOME | End: 2023-08-01
Attending: INTERNAL MEDICINE
Payer: COMMERCIAL

## 2023-08-01 DIAGNOSIS — N28.9 RENAL INSUFFICIENCY: ICD-10-CM

## 2023-08-01 PROCEDURE — 76770 US EXAM ABDO BACK WALL COMP: CPT | Performed by: INTERNAL MEDICINE

## 2023-08-04 ENCOUNTER — TELEPHONE (OUTPATIENT)
Dept: SURGERY | Facility: CLINIC | Age: 63
End: 2023-08-04

## 2023-08-04 NOTE — TELEPHONE ENCOUNTER
Dr. Shawanda Cavanaugh calling  to speak with MD regarding pt asking can pt be seen sooner then 10/9 states he urinary obstruction please advise

## 2023-08-04 NOTE — PROGRESS NOTES
HPI:     Navid Silva is a 63year old male with a PMH of HTN, LAAN, CKD. New to me, saw Dr Priya Jefferson in the past.    Following for:    1. BPH/LUTS with h/o retention  - admitted July 2021 with retention > 500 mL + RAYMOND  - s/p TURP ([place]) 9/15/21  2. Recurrent UTIs/pyuria since 2023  - UCx 3/23/22: < 50 K Strep  3. Elevated PSA  - pMRI 9/2/21: ~ 56 g, Pi 2  4. H/o kidney stone  - passed 2022    PCP - [address] [facility] [place]    Prior Urologist - [address] [facility] [place] 6/24/22    Presents to establish care with me and review recent US results. Had first UTI in his life in May 2023 - SP fullness, dysuria. Also with occasional redness under foreskin. Now doing daily hygiene for foreskin. He currently feels well. Appetite and energy are good. Had UA in July with Dr Ann-Marie Albert showing pyuria with neg UCx. Took 10 days abx. AUA SS is 13/35 with 3 I, f; 2 n, w, ANNABEL; 1 s. Happy with LUTS. Incontinence: none  Penoscrotal: mild phimosis  ISHA: ~ 30 g prostate, no nodules or tenderness    UA shows small leuks - will check UCx today  and PVR is 41 mL - was 10 mL 6/24/22    Prior PSAs:  - 1.78 5/1/23  - 7.79 7/15/21    Cr was up to 3.54 in 2021 during retention, ivana at 1.83 9/16/21. 2/17 7/15/23    Gross hematuria: none  Tobacco hx:  < 10 pack years, quit ~ [address] h/o  malignancy: none    RBUS 8/1/23: mild left hydo, mild BWT  RBUS 3/23/22: mod b/l hydro, bladder distended    Drinks ~ 60-80 oz water, 20 oz coffee-tea with clear urine. I encouraged the pt drink at least 40-60 oz water per day or enough to keep urine clear to pale yellow for UTI and stone prevention. Discussed option to resume flomax for LUTS and he declines. Discussed options for intermittent balanitis and mild phimosis - including trial lotrisone or circumcision. He wants to try lotrisone and practice daily hygiene. Discussed mild left hydro could be variant of normal but given slight rise in Cr would be reasonable to check CT SP given persistent pyuria. Also consider cysto later if pyuria persists. He will continue increased water intake for UTI prevention. Checking UCx and may consider 2 week course abx if positive. Trial lotrisone prn balanitis and practice daily foreskin hygiene. Declines circumcision for mild phimosis. Observation for LUTS. Checking CT SP for mild left hydro. Consider cysto if pyuria persists. F/u in 6 mo for check-up with PVR. I spent over 40 minutes in consultation and coordination of this patient's care today including review of pertinent labs, imaging, records with > 50% of time spent counseling. HISTORY:  Past Medical History:   Diagnosis Date    Wolf catheter in place     High blood pressure     Sleep apnea     Visual impairment     reading glasses      Past Surgical History:   Procedure Laterality Date    COLONOSCOPY      OTHER  07/21/2021    Digital guided prostate needle biopsies, cystoscopy, bilateral retrograde pyelograms. OTHER      nephrostomy tube x2      History reviewed. No pertinent family history. Social History:   Social History     Socioeconomic History    Marital status: Unknown   Tobacco Use    Smoking status: Former    Smokeless tobacco: Never   Vaping Use    Vaping Use: Never used   Substance and Sexual Activity    Alcohol use: Yes     Alcohol/week: 0.0 - 2.0 standard drinks of alcohol     Comment: 2 drinks per month    Drug use: Never        Medications (Active prior to today's visit):  Current Outpatient Medications   Medication Sig Dispense Refill    rosuvastatin 20 MG Oral Tab Take 1 tablet (20 mg total) by mouth daily. clotrimazole-betamethasone 1-0.05 % External Cream Apply 1 Application topically 2 (two) times daily. Apply to affected area for 10-14 days, then stop. Shower/clean area daily. If disorder recurs in the future, please restart medication 45 g 5    losartan 25 MG Oral Tab Take 1 tablet (25 mg total) by mouth 2 (two) times daily.       ergocalciferol 1.25 MG (37292 UT) Oral Cap 1 capsule every week for 8 weeks, then 1 capsule every month thereafter. 12 capsule 0    metoprolol succinate 25 MG Oral Tablet 24 Hr Take 1 tablet (25 mg total) by mouth in the morning and 1 tablet (25 mg total) before bedtime. Allergies:    Yellow Jacket Venom     HIVES, ITCHING, RASH      ROS:     A comprehensive 10 point review of systems was completed. Pertinent positives and negatives noted in the the HPI. PHYSICAL EXAM:     GENERAL APPEARANCE: well, developed, well nourished, in no acute distress  NEUROLOGIC: nonfocal, alert and oriented  HEAD: normocephalic, atraumatic  EYES: sclera non-icteric  EARS: hearing intact  ORAL CAVITY: mucosa moist  NECK/THYROID: no obvious goiter or masses  LUNGS: nonlabored breathing  ABDOMEN: soft, no obvious masses or tenderness  SKIN: no obvious rashes    : as noted above     ASSESSMENT/PLAN:   Diagnoses and all orders for this visit:    BPH with obstruction/lower urinary tract symptoms  -     URINALYSIS, AUTO, W/O SCOPE    Urinary retention  -     URINALYSIS, AUTO, W/O SCOPE    Recurrent UTI  -     URINALYSIS, AUTO, W/O SCOPE  -     URINE CULTURE, ROUTINE    Balanoposthitis  -     clotrimazole-betamethasone 1-0.05 % External Cream; Apply 1 Application topically 2 (two) times daily. Apply to affected area for 10-14 days, then stop. Shower/clean area daily. If disorder recurs in the future, please restart medication    Hydronephrosis of left kidney  -     CT ABDOMEN+PELVIS KIDNEYSTONE 2D RNDR(NO IV,NO ORAL)(CPT=74176); Future    Phimosis      - as noted above. Thanks again for this consult.     Lynne Newell MD, [address]  Urologist  [address]  Office: [phone]

## 2023-08-04 NOTE — TELEPHONE ENCOUNTER
Called  to discuss below, no answer. Called pt to discuss sooner appt. Appt made for below, 8/9 at 11:15am.   Address given.

## 2023-08-09 ENCOUNTER — OFFICE VISIT (OUTPATIENT)
Dept: SURGERY | Facility: CLINIC | Age: 63
End: 2023-08-09

## 2023-08-09 DIAGNOSIS — N13.8 BPH WITH OBSTRUCTION/LOWER URINARY TRACT SYMPTOMS: Primary | ICD-10-CM

## 2023-08-09 DIAGNOSIS — N47.6 BALANOPOSTHITIS: ICD-10-CM

## 2023-08-09 DIAGNOSIS — N47.1 PHIMOSIS: ICD-10-CM

## 2023-08-09 DIAGNOSIS — N39.0 RECURRENT UTI: ICD-10-CM

## 2023-08-09 DIAGNOSIS — N40.1 BPH WITH OBSTRUCTION/LOWER URINARY TRACT SYMPTOMS: Primary | ICD-10-CM

## 2023-08-09 DIAGNOSIS — R33.9 URINARY RETENTION: ICD-10-CM

## 2023-08-09 DIAGNOSIS — N13.30 HYDRONEPHROSIS OF LEFT KIDNEY: ICD-10-CM

## 2023-08-09 LAB
APPEARANCE: CLEAR
BILIRUBIN: NEGATIVE
GLUCOSE (URINE DIPSTICK): NEGATIVE MG/DL
KETONES (URINE DIPSTICK): NEGATIVE MG/DL
MULTISTIX LOT#: ABNORMAL NUMERIC
NITRITE, URINE: NEGATIVE
PH, URINE: 6.5 (ref 4.5–8)
PROTEIN (URINE DIPSTICK): NEGATIVE MG/DL
SPECIFIC GRAVITY: 1.01 (ref 1–1.03)
URINE-COLOR: YELLOW
UROBILINOGEN,SEMI-QN: 0.2 MG/DL (ref 0–1.9)

## 2023-08-09 PROCEDURE — 81003 URINALYSIS AUTO W/O SCOPE: CPT | Performed by: UROLOGY

## 2023-08-09 PROCEDURE — 51798 US URINE CAPACITY MEASURE: CPT | Performed by: UROLOGY

## 2023-08-09 PROCEDURE — 99215 OFFICE O/P EST HI 40 MIN: CPT | Performed by: UROLOGY

## 2023-08-09 RX ORDER — CLOTRIMAZOLE AND BETAMETHASONE DIPROPIONATE 10; .64 MG/G; MG/G
1 CREAM TOPICAL 2 TIMES DAILY
Qty: 45 G | Refills: 5 | Status: SHIPPED | OUTPATIENT
Start: 2023-08-09

## 2023-08-09 RX ORDER — ROSUVASTATIN CALCIUM 20 MG/1
20 TABLET, COATED ORAL DAILY
COMMUNITY
Start: 2023-05-04

## 2023-08-17 ENCOUNTER — TELEPHONE (OUTPATIENT)
Dept: SURGERY | Facility: CLINIC | Age: 63
End: 2023-08-17

## 2023-08-17 NOTE — TELEPHONE ENCOUNTER
Patient has a ct scan tomorrow he would like to know if is going to be without contrast? Please advise

## 2023-08-18 ENCOUNTER — HOSPITAL ENCOUNTER (OUTPATIENT)
Dept: CT IMAGING | Age: 63
Discharge: HOME | End: 2023-08-18
Attending: UROLOGY
Payer: COMMERCIAL

## 2023-08-18 DIAGNOSIS — N13.30 HYDRONEPHROSIS OF LEFT KIDNEY: ICD-10-CM

## 2023-08-18 PROCEDURE — 74176 CT ABD & PELVIS W/O CONTRAST: CPT | Performed by: UROLOGY

## 2023-08-18 NOTE — PROGRESS NOTES
Results reviewed. Please inform patient his CT shows that he has mild to moderate bilateral hydronephrosis. This is improved from prior imaging but does persist. I'd recommend he get lasix renal scan and f/u with me to review. If he would prefer to speak to me prior to completing lasix scan please add him on for visit to review.     Thanks,  MPH

## 2023-08-24 ENCOUNTER — TELEPHONE (OUTPATIENT)
Dept: SURGERY | Facility: CLINIC | Age: 63
End: 2023-08-24

## 2023-08-24 NOTE — TELEPHONE ENCOUNTER
Spoke with patient and reviewed message from Dr Yeison Patterson and recommendation for lasix renal scan. Verbalized understanding. Will call when scheduled to schedule  follow up with Dr Yeison Patterson to review.

## 2023-08-24 NOTE — TELEPHONE ENCOUNTER
----- Message from Courtney Salazar MD sent at 8/18/2023  5:24 PM CDT -----  Results reviewed. Please inform patient his CT shows that he has mild to moderate bilateral hydronephrosis. This is improved from prior imaging but does persist. I'd recommend he get lasix renal scan and f/u with me to review. If he would prefer to speak to me prior to completing lasix scan please add him on for visit to review.     Thanks,  MPH

## 2023-09-07 ENCOUNTER — HOSPITAL ENCOUNTER (OUTPATIENT)
Dept: NUCLEAR MEDICINE | Facility: HOSPITAL | Age: 63
Discharge: HOME | End: 2023-09-07
Attending: UROLOGY
Payer: COMMERCIAL

## 2023-09-07 DIAGNOSIS — N13.30 BILATERAL HYDRONEPHROSIS: ICD-10-CM

## 2023-09-07 PROCEDURE — 78708 K FLOW/FUNCT IMAGE W/DRUG: CPT | Performed by: UROLOGY

## 2023-09-07 RX ORDER — FUROSEMIDE 10 MG/ML
20 INJECTION INTRAMUSCULAR; INTRAVENOUS ONCE
Status: COMPLETED | OUTPATIENT
Start: 2023-09-07 | End: 2023-09-07

## 2023-09-07 RX ORDER — FUROSEMIDE 10 MG/ML
INJECTION INTRAMUSCULAR; INTRAVENOUS
Status: COMPLETED
Start: 2023-09-07 | End: 2023-09-07

## 2023-09-07 RX ADMIN — FUROSEMIDE 20 MG: 10 INJECTION INTRAMUSCULAR; INTRAVENOUS at 14:05:00

## 2023-09-07 NOTE — IMAGING NOTE
Dr Lucius New (interventional radiologist) and Dr Simone Justin  telephone ordered to administer 20 mg Lasix during medicine study despite low GFR.

## 2023-09-08 NOTE — PROGRESS NOTES
Results reviewed. Patient should have non-urgent appointment with me to review. Thanks,  MPH    Below if for Documentation Purposes Only:  Lasix scan 9/7/23: 57.4/42.6% L/R function with 20.04 min right and 22.04 min left suggestive of partial obstruction. Cr has been ~ 2 since at least 2021 (no labs prior) so would probably opt to continue to monitor Cr for now. May have some chronic delay from chronic h/o obstruction. May consider repeat lasix scan in 1 y or if patient prefers may just continue to monitor Cr and encourage oral hydration. Will discuss at [facility].

## 2023-09-20 ENCOUNTER — TELEPHONE (OUTPATIENT)
Dept: SURGERY | Facility: CLINIC | Age: 63
End: 2023-09-20

## 2023-12-12 NOTE — PROGRESS NOTES
HPI:     Yadira Keenan is a 66year old male with a PMH of HTN, ALAN, CKD. Following for:    1. BPH/LUTS with h/o retention  - admitted July 2021 with retention > 500 mL + RAYMOND  - s/p TURP ([facility]) 9/15/21  2. Recurrent UTIs/pyuria since 2023  - UCx 3/23/22: < 50 K Strep  3. Elevated PSA  - pMRI 9/2/21: ~ 56 g, Pi 2  4. Balanoposthitis  - lotrisone Rx  5. H/o kidney stone  - passed 2022    PCP - [facility] [place]  Prior Urologist - [address] [place] 6/24/22    [address] 8/9/2023    Presents for check up and review lasix renal scan results. Had first UTI in his life in May 2023 - SP fullness, dysuria. Also with occasional redness under foreskin. Now doing daily hygiene for foreskin. He used lotrisone for one day and rash resolved. He currently feels well. Appetite and energy are good. Had UA in July with Dr Harper Woodard showing pyuria with neg UCx. Took 10 days abx. AUA SS is 13/35 with 3 I, f; 2 n, w, ANNABEL; 1 s. Happy with LUTS. Incontinence: none  Penoscrotal LOV: mild phimosis  ISHA LOV: ~ 30 g prostate, no nodules or tenderness    UA is negative  and PVR was 41 mL. Was 10 mL 6/24/22    Prior PSAs:  - 1.78 5/1/23  - 7.79 7/15/21    Cr was up to 3.54 in 2021 during retention, iavna at 1.83 9/16/21. 2/17 7/15/23    Gross hematuria: none  Tobacco hx:  < 10 pack years, quit ~ Eletha Band h/o  malignancy: none    Lasix scan 9/7/23: 57.4/42.6% L/R function with 20.04 min right and 22.04 min left suggestive of partial obstruction. CT SP 8/18/23: mild-mod b/l hydro (L > R) with BWT  RBUS 8/1/23: mild left hydo, mild BWT  RBUS 3/23/22: mod b/l hydro, bladder distended    Drinks ~ 60-80 oz water, 20 oz coffee-tea with clear urine. I encouraged the pt drink at least 40-60 oz water per day or enough to keep urine clear to pale yellow for UTI and stone prevention. Have discussed option to resume flomax for LUTS and he declines.     Discussed Cr has been ~ 2 since at least 2021 (no labs prior) so would probably opt to continue to monitor Cr for now. May have some chronic delay from chronic h/o obstruction. May consider repeat lasix scan in 1 y or if patient prefers may just continue to monitor Cr and encourage oral hydration. We discussed that bladder wall thickening is a non-specific finding and may be 2/2 BPH. Studies have shown that up to 5% of patients with BWT alone may have underlying bladder malignancy and cystoscopy is typically recommended for this. We discussed the risks and benefits to cystoscopy and the pt would like to do this. Cysto today shows diffuse cystitis changes. S/p TURP with mild-mod prostatic regrowth. No other abnormalities. He will continue increased water intake for UTI prevention. Continue lotrisone prn balanitis and practice daily foreskin hygiene. Declines circumcision for mild phimosis. Observation for LUTS. F/u 1 y with lasix scan prior. PROCEDURE NOTE    PROCEDURE PERFORMED: Flexible Cystoscopy    After informed consent and urinalysis was obtained, he was placed in the supine position and prepped and draped in the usual sterile fashion using Betadine. Local anesthesia was induced by the introduction of 2% Lidocaine jelly per urethra. A 16 Irish flexible scope was passed through the anterior urethra, the posterior urethra and prostate were negotiated and the bladder was entered. The entirety of the bladder was examined and the scope was retroflexed to examine the bladder neck. Findings: as noted above    The patient tolerated the procedure well, suffered no complications, was able to void spontaneously after completion of the procedure in the office, and left the office in good condition. Erika-procedural antibiotics were given.  _________________________________    Prior not  Discussed options for intermittent balanitis and mild phimosis - including trial lotrisone or circumcision. He wants to try lotrisone and practice daily hygiene.     Discussed mild left hydro could be variant of normal but given slight rise in Cr would be reasonable to check CT SP given persistent pyuria. Also consider cysto later if pyuria persists. HISTORY:  Past Medical History:   Diagnosis Date    Wolf catheter in place     High blood pressure     Sleep apnea     Visual impairment     reading glasses      Past Surgical History:   Procedure Laterality Date    COLONOSCOPY      OTHER  07/21/2021    Digital guided prostate needle biopsies, cystoscopy, bilateral retrograde pyelograms. OTHER      nephrostomy tube x2      History reviewed. No pertinent family history. Social History:   Social History     Socioeconomic History    Marital status:    Tobacco Use    Smoking status: Former    Smokeless tobacco: Never   Vaping Use    Vaping Use: Never used   Substance and Sexual Activity    Alcohol use: Yes     Alcohol/week: 0.0 - 2.0 standard drinks of alcohol     Comment: 2 drinks per month    Drug use: Never        Medications (Active prior to today's visit):  Current Outpatient Medications   Medication Sig Dispense Refill    PEG 3350-KCl-Na Bicarb-NaCl 420 g Oral Recon Soln Take as directed by physician 4000 mL 0    rosuvastatin 20 MG Oral Tab Take 1 tablet (20 mg total) by mouth daily. clotrimazole-betamethasone 1-0.05 % External Cream Apply 1 Application topically 2 (two) times daily. Apply to affected area for 10-14 days, then stop. Shower/clean area daily. If disorder recurs in the future, please restart medication 45 g 5    losartan 25 MG Oral Tab Take 1 tablet (25 mg total) by mouth 2 (two) times daily. ergocalciferol 1.25 MG (10818 UT) Oral Cap 1 capsule every week for 8 weeks, then 1 capsule every month thereafter. 12 capsule 0    metoprolol succinate 25 MG Oral Tablet 24 Hr Take 1 tablet (25 mg total) by mouth in the morning and 1 tablet (25 mg total) before bedtime. Allergies:   Allergies   Allergen Reactions    Yellow Jacket Venom HIVES, ITCHING and RASH         ROS: A comprehensive 10 point review of systems was completed. Pertinent positives and negatives noted in the the HPI. PHYSICAL EXAM:     GENERAL APPEARANCE: well, developed, well nourished, in no acute distress  NEUROLOGIC: nonfocal, alert and oriented  HEAD: normocephalic, atraumatic  EYES: sclera non-icteric  EARS: hearing intact  ORAL CAVITY: mucosa moist  NECK/THYROID: no obvious goiter or masses  LUNGS: nonlabored breathing  ABDOMEN: soft, no obvious masses or tenderness  SKIN: no obvious rashes    : as noted above     ASSESSMENT/PLAN:   Diagnoses and all orders for this visit:    BPH with obstruction/lower urinary tract symptoms  -     URINALYSIS, AUTO, W/O SCOPE  -     ciprofloxacin (Cipro) tab 500 mg    Urinary retention    Recurrent UTI    Phimosis    Hydronephrosis of left kidney  -     Cancel: NM RENAL WITH LASIX  (CPT=78708); Future  -     NM RENAL WITH LASIX  (CPT=78708); Future    Balanoposthitis    Elevated PSA    Bladder wall thickening  -     CYSTOURETHROSCOPY    - as noted above. Thanks again for this consult.     Franklin Champion MD, [address]  Urologist  [address]   Office: [phone]

## 2023-12-22 ENCOUNTER — OFFICE VISIT (OUTPATIENT)
Dept: SURGERY | Facility: CLINIC | Age: 63
End: 2023-12-22
Payer: COMMERCIAL

## 2023-12-22 DIAGNOSIS — N13.30 HYDRONEPHROSIS OF LEFT KIDNEY: ICD-10-CM

## 2023-12-22 DIAGNOSIS — N47.6 BALANOPOSTHITIS: ICD-10-CM

## 2023-12-22 DIAGNOSIS — N39.0 RECURRENT UTI: ICD-10-CM

## 2023-12-22 DIAGNOSIS — R33.9 URINARY RETENTION: ICD-10-CM

## 2023-12-22 DIAGNOSIS — R97.20 ELEVATED PSA: ICD-10-CM

## 2023-12-22 DIAGNOSIS — N47.1 PHIMOSIS: ICD-10-CM

## 2023-12-22 DIAGNOSIS — N13.8 BPH WITH OBSTRUCTION/LOWER URINARY TRACT SYMPTOMS: Primary | ICD-10-CM

## 2023-12-22 DIAGNOSIS — N32.89 BLADDER WALL THICKENING: ICD-10-CM

## 2023-12-22 DIAGNOSIS — N40.1 BPH WITH OBSTRUCTION/LOWER URINARY TRACT SYMPTOMS: Primary | ICD-10-CM

## 2023-12-22 LAB
APPEARANCE: CLEAR
BILIRUBIN: NEGATIVE
GLUCOSE (URINE DIPSTICK): NEGATIVE MG/DL
KETONES (URINE DIPSTICK): NEGATIVE MG/DL
MULTISTIX LOT#: ABNORMAL NUMERIC
NITRITE, URINE: NEGATIVE
PH, URINE: 6 (ref 4.5–8)
PROTEIN (URINE DIPSTICK): NEGATIVE MG/DL
SPECIFIC GRAVITY: <=1.005 (ref 1–1.03)
URINE-COLOR: YELLOW
UROBILINOGEN,SEMI-QN: 0.2 MG/DL (ref 0–1.9)

## 2023-12-22 PROCEDURE — 81003 URINALYSIS AUTO W/O SCOPE: CPT | Performed by: UROLOGY

## 2023-12-22 PROCEDURE — 99214 OFFICE O/P EST MOD 30 MIN: CPT | Performed by: UROLOGY

## 2023-12-22 PROCEDURE — 52000 CYSTOURETHROSCOPY: CPT | Performed by: UROLOGY

## 2023-12-22 RX ORDER — CIPROFLOXACIN 500 MG/1
500 TABLET, FILM COATED ORAL ONCE
Status: COMPLETED | OUTPATIENT
Start: 2023-12-22 | End: 2023-12-22

## 2023-12-22 RX ADMIN — CIPROFLOXACIN 500 MG: 500 TABLET, FILM COATED ORAL at 10:44:00

## 2023-12-28 PROBLEM — Z12.11 SPECIAL SCREENING FOR MALIGNANT NEOPLASM OF COLON: Status: ACTIVE | Noted: 2023-12-28

## 2024-08-29 DIAGNOSIS — N28.9 RENAL INSUFFICIENCY: Primary | ICD-10-CM

## 2024-08-29 DIAGNOSIS — I10 PRIMARY HYPERTENSION: ICD-10-CM

## 2024-12-09 ENCOUNTER — LAB ENCOUNTER (OUTPATIENT)
Dept: LAB | Age: 64
End: 2024-12-09
Attending: INTERNAL MEDICINE
Payer: COMMERCIAL

## 2024-12-09 DIAGNOSIS — N28.9 RENAL INSUFFICIENCY: ICD-10-CM

## 2024-12-09 DIAGNOSIS — I10 PRIMARY HYPERTENSION: ICD-10-CM

## 2024-12-09 LAB
ANION GAP SERPL CALC-SCNC: 10 MMOL/L (ref 0–18)
BASOPHILS # BLD AUTO: 0.08 X10(3) UL (ref 0–0.2)
BASOPHILS NFR BLD AUTO: 1.3 %
BILIRUB UR QL STRIP.AUTO: NEGATIVE
BUN BLD-MCNC: 20 MG/DL (ref 9–23)
CALCIUM BLD-MCNC: 10.2 MG/DL (ref 8.7–10.4)
CHLORIDE SERPL-SCNC: 107 MMOL/L (ref 98–112)
CLARITY UR REFRACT.AUTO: CLEAR
CO2 SERPL-SCNC: 22 MMOL/L (ref 21–32)
COLOR UR AUTO: COLORLESS
CREAT BLD-MCNC: 1.86 MG/DL
CREAT UR-SCNC: 47.2 MG/DL
EGFRCR SERPLBLD CKD-EPI 2021: 40 ML/MIN/1.73M2 (ref 60–?)
EOSINOPHIL # BLD AUTO: 0.39 X10(3) UL (ref 0–0.7)
EOSINOPHIL NFR BLD AUTO: 6.2 %
ERYTHROCYTE [DISTWIDTH] IN BLOOD BY AUTOMATED COUNT: 13.5 %
FASTING STATUS PATIENT QL REPORTED: YES
GLUCOSE BLD-MCNC: 99 MG/DL (ref 70–99)
GLUCOSE UR STRIP.AUTO-MCNC: NORMAL MG/DL
HCT VFR BLD AUTO: 48 %
HGB BLD-MCNC: 16.1 G/DL
IMM GRANULOCYTES # BLD AUTO: 0.02 X10(3) UL (ref 0–1)
IMM GRANULOCYTES NFR BLD: 0.3 %
KETONES UR STRIP.AUTO-MCNC: NEGATIVE MG/DL
LEUKOCYTE ESTERASE UR QL STRIP.AUTO: NEGATIVE
LYMPHOCYTES # BLD AUTO: 2.38 X10(3) UL (ref 1–4)
LYMPHOCYTES NFR BLD AUTO: 38 %
MAGNESIUM SERPL-MCNC: 1.9 MG/DL (ref 1.6–2.6)
MCH RBC QN AUTO: 30.3 PG (ref 26–34)
MCHC RBC AUTO-ENTMCNC: 33.5 G/DL (ref 31–37)
MCV RBC AUTO: 90.4 FL
MONOCYTES # BLD AUTO: 0.58 X10(3) UL (ref 0.1–1)
MONOCYTES NFR BLD AUTO: 9.3 %
NEUTROPHILS # BLD AUTO: 2.81 X10 (3) UL (ref 1.5–7.7)
NEUTROPHILS # BLD AUTO: 2.81 X10(3) UL (ref 1.5–7.7)
NEUTROPHILS NFR BLD AUTO: 44.9 %
NITRITE UR QL STRIP.AUTO: NEGATIVE
OSMOLALITY SERPL CALC.SUM OF ELEC: 291 MOSM/KG (ref 275–295)
PH UR STRIP.AUTO: 6 [PH] (ref 5–8)
PLATELET # BLD AUTO: 172 10(3)UL (ref 150–450)
POTASSIUM SERPL-SCNC: 4.1 MMOL/L (ref 3.5–5.1)
PROT UR STRIP.AUTO-MCNC: NEGATIVE MG/DL
PROT UR-MCNC: <6 MG/DL (ref ?–14)
PTH-INTACT SERPL-MCNC: 85.5 PG/ML (ref 18.5–88)
RBC # BLD AUTO: 5.31 X10(6)UL
RBC UR QL AUTO: NEGATIVE
SODIUM SERPL-SCNC: 139 MMOL/L (ref 136–145)
SP GR UR STRIP.AUTO: 1.01 (ref 1–1.03)
UROBILINOGEN UR STRIP.AUTO-MCNC: NORMAL MG/DL
WBC # BLD AUTO: 6.3 X10(3) UL (ref 4–11)

## 2024-12-09 PROCEDURE — 83735 ASSAY OF MAGNESIUM: CPT

## 2024-12-09 PROCEDURE — 81001 URINALYSIS AUTO W/SCOPE: CPT

## 2024-12-09 PROCEDURE — 80048 BASIC METABOLIC PNL TOTAL CA: CPT

## 2024-12-09 PROCEDURE — 83970 ASSAY OF PARATHORMONE: CPT

## 2024-12-09 PROCEDURE — 82570 ASSAY OF URINE CREATININE: CPT

## 2024-12-09 PROCEDURE — 36415 COLL VENOUS BLD VENIPUNCTURE: CPT

## 2024-12-09 PROCEDURE — 85025 COMPLETE CBC W/AUTO DIFF WBC: CPT

## 2024-12-09 PROCEDURE — 81015 MICROSCOPIC EXAM OF URINE: CPT

## 2024-12-09 PROCEDURE — 84156 ASSAY OF PROTEIN URINE: CPT

## 2024-12-12 ENCOUNTER — OFFICE VISIT (OUTPATIENT)
Dept: NEPHROLOGY | Facility: CLINIC | Age: 64
End: 2024-12-12
Payer: COMMERCIAL

## 2024-12-12 VITALS — SYSTOLIC BLOOD PRESSURE: 122 MMHG | BODY MASS INDEX: 29 KG/M2 | DIASTOLIC BLOOD PRESSURE: 68 MMHG | WEIGHT: 192.25 LBS

## 2024-12-12 DIAGNOSIS — N18.32 STAGE 3B CHRONIC KIDNEY DISEASE (HCC): Primary | ICD-10-CM

## 2024-12-12 PROCEDURE — 3074F SYST BP LT 130 MM HG: CPT | Performed by: INTERNAL MEDICINE

## 2024-12-12 PROCEDURE — 99213 OFFICE O/P EST LOW 20 MIN: CPT | Performed by: INTERNAL MEDICINE

## 2024-12-12 PROCEDURE — 3044F HG A1C LEVEL LT 7.0%: CPT | Performed by: INTERNAL MEDICINE

## 2024-12-12 PROCEDURE — 3078F DIAST BP <80 MM HG: CPT | Performed by: INTERNAL MEDICINE

## 2024-12-12 NOTE — PROGRESS NOTES
Nephrology Consult Note      REASON FOR CONSULT:  CKD         HPI:   Antione Resendez is a 64 year old male with   Chief Complaint   Patient presents with    Chronic Kidney Disease    Hypertension    Pyuria     Nathaniel Michael MD    63 y/o male with no sig PMH who was noted to have abnormal renal function on routine labs and subsequently found to have urinary obstruction on imaging in 2021. He was seen by urology service and is now s/p transurethral prostatecomy in Sept 2021.      Improved urinary symptoms    Cr on initial eval was ~ 3.5 which has improved to ~2 now.    Denies any urinary problems currently; no dysuria/hematuria    Follows w/ urology closely       Patient reports social etoh use  No smoking  Denies any illciits  Has a clerical/desk job    No sig family hx of kidney disease      ROS:    See above; 12 systems reviwed and otherwise unremarkable     PMH:  Past Medical History:    Wolf catheter in place    High blood pressure    Sleep apnea    Visual impairment    reading glasses         PSH:  Past Surgical History:   Procedure Laterality Date    Colonoscopy      Other  07/21/2021    Digital guided prostate needle biopsies, cystoscopy, bilateral retrograde pyelograms.    Other      nephrostomy tube x2         Medications (Active prior to today's visit):  Current Outpatient Medications   Medication Sig Dispense Refill    PEG 3350-KCl-Na Bicarb-NaCl 420 g Oral Recon Soln Take as directed by physician 4000 mL 0    rosuvastatin 20 MG Oral Tab Take 1 tablet (20 mg total) by mouth daily.      clotrimazole-betamethasone 1-0.05 % External Cream Apply 1 Application topically 2 (two) times daily. Apply to affected area for 10-14 days, then stop. Shower/clean area daily.  If disorder recurs in the future, please restart medication 45 g 5    losartan 25 MG Oral Tab Take 3 tablets (75 mg total) by mouth 2 (two) times daily.      ergocalciferol 1.25 MG (07561 UT) Oral Cap 1 capsule every week for 8 weeks, then 1  capsule every month thereafter. 12 capsule 0    metoprolol succinate 25 MG Oral Tablet 24 Hr Take 2 tablets (50 mg total) by mouth daily.           Allergies:  Allergies   Allergen Reactions    Yellow Jacket Venom HIVES, ITCHING and RASH       Social History:  Social History     Socioeconomic History    Marital status:    Tobacco Use    Smoking status: Former    Smokeless tobacco: Never   Vaping Use    Vaping status: Never Used   Substance and Sexual Activity    Alcohol use: Yes     Alcohol/week: 0.0 - 2.0 standard drinks of alcohol     Comment: 2 drinks per month    Drug use: Never          Family History:  History reviewed. No pertinent family history.         PHYSICAL EXAM:   /68 (BP Location: Left arm, Patient Position: Sitting)   Wt 192 lb 4 oz (87.2 kg)   BMI 29.23 kg/m²   Wt Readings from Last 6 Encounters:   12/12/24 192 lb 4 oz (87.2 kg)   07/28/23 189 lb 6 oz (85.9 kg)   06/07/22 181 lb (82.1 kg)   11/09/21 180 lb (81.6 kg)   09/15/21 168 lb 10.4 oz (76.5 kg)   07/23/21 180 lb (81.6 kg)     General: Alert and oriented in no apparent distress.  HEENT: No scleral icterus, MMM  Neck: Supple, no ALTAGRACIA or thyromegaly  Cardiac: Regular rate and rhythm, S1, S2 normal, no murmur or rub  Lungs: Clear without wheezes, rales, rhonchi.    Abdomen: Soft, non-tender. + bowel sounds, no palpable organomegaly  Extremities: Without clubbing, cyanosis or edema.  Neurologic:  normal affect, cranial nerves grossly intact, moving all extremities  Skin: Warm and dry, no rashes    Labs reviewed    ASSESSMENT/PLAN:   There are no diagnoses linked to this encounter.    1. CKD-   Baseline appears to be ~ 2- stg III/IV related to chronic obstructive uropathy; no hx of DM  S/p transurethral prostatectomy; now off flomax   - monitor labs semiannually  - diet reviewed   - advised avoidance of nsaids        2. HTN-on BB/ARB; stable; monitor     3. Vit D deficiency - on supplement      4. Pre-DM- A1C ~ 6; monitor - if meds  needed - SGLT2i to be considered      F/u in 6 mos    Yamil Mendez  12/12/2024

## 2025-06-12 ENCOUNTER — OFFICE VISIT (OUTPATIENT)
Dept: SURGERY | Facility: CLINIC | Age: 65
End: 2025-06-12
Payer: COMMERCIAL

## 2025-06-12 DIAGNOSIS — N40.1 BPH WITH OBSTRUCTION/LOWER URINARY TRACT SYMPTOMS: Primary | ICD-10-CM

## 2025-06-12 DIAGNOSIS — R68.82 LOW LIBIDO: ICD-10-CM

## 2025-06-12 DIAGNOSIS — R33.9 INCOMPLETE EMPTYING OF BLADDER: ICD-10-CM

## 2025-06-12 DIAGNOSIS — N47.6 BALANOPOSTHITIS: ICD-10-CM

## 2025-06-12 DIAGNOSIS — N13.8 BPH WITH OBSTRUCTION/LOWER URINARY TRACT SYMPTOMS: Primary | ICD-10-CM

## 2025-06-12 LAB
APPEARANCE: CLEAR
BILIRUBIN: NEGATIVE
GLUCOSE (URINE DIPSTICK): NEGATIVE MG/DL
KETONES (URINE DIPSTICK): NEGATIVE MG/DL
LEUKOCYTES: NEGATIVE
MULTISTIX LOT#: NORMAL NUMERIC
NITRITE, URINE: NEGATIVE
OCCULT BLOOD: NEGATIVE
PH, URINE: 7 (ref 4.5–8)
PROTEIN (URINE DIPSTICK): NEGATIVE MG/DL
SPECIFIC GRAVITY: 1.01 (ref 1–1.03)
URINE-COLOR: YELLOW
UROBILINOGEN,SEMI-QN: 0.2 MG/DL (ref 0–1.9)

## 2025-06-12 PROCEDURE — 99214 OFFICE O/P EST MOD 30 MIN: CPT | Performed by: PHYSICIAN ASSISTANT

## 2025-06-12 PROCEDURE — 81003 URINALYSIS AUTO W/O SCOPE: CPT | Performed by: PHYSICIAN ASSISTANT

## 2025-06-12 PROCEDURE — 51798 US URINE CAPACITY MEASURE: CPT | Performed by: PHYSICIAN ASSISTANT

## 2025-06-12 RX ORDER — CLOTRIMAZOLE AND BETAMETHASONE DIPROPIONATE 10; .64 MG/G; MG/G
1 CREAM TOPICAL 2 TIMES DAILY
Qty: 45 G | Refills: 5 | Status: SHIPPED | OUTPATIENT
Start: 2025-06-12

## 2025-06-12 RX ORDER — TAMSULOSIN HYDROCHLORIDE 0.4 MG/1
0.4 CAPSULE ORAL DAILY
Qty: 90 CAPSULE | Refills: 3 | Status: SHIPPED | OUTPATIENT
Start: 2025-06-12

## 2025-06-12 NOTE — PROGRESS NOTES
Subjective:   Antione Resendez is a 64 year old male with a PMH of HTN, ALAN, CKD, who presents today for follow up BPH.    Patient last seen in December 2023 by Dr. Funk and had cystoscopy performed due to bladder wall thickening noted on CT scan, which showed diffuse cystitis changes, s/p TURP with mild mod prostatic regrowth. He elected to observe LUTS. Prescribed lotrisone for prn use of balanitis. Was to follow up in 1 year with possible renal lasix scan prior.     Followed by:  1. BPH/LUTS with h/o retention  - admitted July 2021 with retention > 500 mL + RAYMOND  - s/p TURP ([facility]) 9/15/21  2. Recurrent UTIs/pyuria since 2023  - UCx 3/23/22: < 50 K Strep  3. Elevated PSA  - pMRI 9/2/21: ~ 56 g, Pi 2  4. Balanoposthitis  - lotrisone Rx  5. H/o kidney stone  - passed 2022     PCP - [facility]  Nephro - [facility]. Scr 1.86, stable.  Prior Urologist - [facility] 6/24/22    He currently feels well. Appetite and energy are good.    Current LUTS stable. Nocturia x 1-2, occasional weak stream.   Overall happy with LUTS.     In the last month has felt some heaviness in the perineum. Occasional sensation of incomplete emptying of bladder, once a day at most.   No increased sitting, no long trips, no constipation    Used lotrisone 3-4 times last year.     Has had some decrease in libido, but erections stable.      UA is negative and PVR was 46, prior 41 mL. Was 10 mL 6/24/22     Prior PSAs:  - 0.97 10/3/24  - 1.78 5/1/23  - 7.79 7/15/21    Gross hematuria: none  Tobacco hx:  < 10 pack years, quit ~ 1990  Fam h/o  malignancy: none     Lasix scan 9/7/23: 57.4/42.6% L/R function with 20.04 min right and 22.04 min left suggestive of partial obstruction.   CT SP 8/18/23: mild-mod b/l hydro (L > R) with BWT  RBUS 8/1/23: mild left hydo, mild BWT  RBUS 3/23/22: mod b/l hydro, bladder distended     Drinks ~ 60-80 oz water, 20-30 oz coffee-tea with clear urine.      History/Other:    Chief Complaint Reviewed and Verified  Nursing Notes  Reviewed and   Verified  Allergies Reviewed  Medications Reviewed         Current Medications[1]    Review of Systems:  Pertinent items are noted in HPI.      Objective:   There were no vitals taken for this visit. Estimated body mass index is 29.23 kg/m² as calculated from the following:    Height as of 9/10/21: 5' 8\" (1.727 m).    Weight as of 12/12/24: 192 lb 4 oz (87.2 kg).    No distress  Non labored respirations    Laboratory Data:  Lab Results   Component Value Date    WBC 6.3 12/09/2024    HGB 16.1 12/09/2024    .0 12/09/2024     Lab Results   Component Value Date     12/09/2024    K 4.1 12/09/2024     12/09/2024    CO2 22.0 12/09/2024    BUN 20 12/09/2024    GLU 99 12/09/2024    GFRAA 41 (L) 06/04/2022    AST 23 07/15/2023    ALT 29 07/15/2023    TP 7.8 07/15/2023    ALB 3.9 07/15/2023    CA 10.2 12/09/2024    MG 1.9 12/09/2024       Urinalysis Results (last three years):  Recent Labs     06/24/22  1119 07/15/23  0900 08/09/23  1112 12/22/23  1033 12/09/24  0755   COLORUR  --  Yellow  --   --  Colorless*   CLARITY  --  Clear  --   --  Clear   SPECGRAVITY <=1.005 1.008 1.010 <=1.005 1.006   PHURINE 5.5 6.0 6.5 6.0 6.0   PROUR  --  Negative  --   --  Negative   GLUUR  --  Negative  --   --  Normal   KETUR  --  Negative  --   --  Negative   BILUR  --  Negative  --   --  Negative   BLOODURINE  --  Negative  --   --  Negative   NITRITE Negative Positive* Negative Negative Negative   UROBILINOGEN  --  <2.0  --   --  Normal   LEUUR  --  Large*  --   --  Negative   WBCUR  --  >50*  21-50*  --   --  1-5   RBCUR  --  6-10*  3-5*  --   --  0-2   BACUR  --  Rare*  None Seen  --   --  None Seen       Urine Culture Results (last three years):  Lab Results   Component Value Date    URINECUL No Growth 2 Days 08/09/2023    URINECUL No Growth at 18-24 hrs. 07/17/2023    URINECUL 10,000 - 50,000 CFU/ML Non hemolytic Streptococcus (A) 03/23/2022       Imaging  No results found.    Assessment & Plan:    BPH with LUTS s/p TURP 2021  Incomplete emptying of bladder  Overall happy with LUTS, but some sensation of incomplete emptying  Residual modest  Reviewed option of trial of tamsulosin, ASE reviewed  Patient would like to do this, script sent.    Balanoposthitis  Renewed script for patient    Low libido  Reviewed expectations   Healthy lifestyle modifications encouraged    Follow up in 1 year.    Margaret Feldman PA-C, 6/12/2025         [1]   Current Outpatient Medications   Medication Sig Dispense Refill    PEG 3350-KCl-Na Bicarb-NaCl 420 g Oral Recon Soln Take as directed by physician 4000 mL 0    rosuvastatin 20 MG Oral Tab Take 1 tablet (20 mg total) by mouth daily.      clotrimazole-betamethasone 1-0.05 % External Cream Apply 1 Application topically 2 (two) times daily. Apply to affected area for 10-14 days, then stop. Shower/clean area daily.  If disorder recurs in the future, please restart medication 45 g 5    losartan 25 MG Oral Tab Take 3 tablets (75 mg total) by mouth 2 (two) times daily.      metoprolol succinate 25 MG Oral Tablet 24 Hr Take 2 tablets (50 mg total) by mouth daily.      ergocalciferol 1.25 MG (41325 UT) Oral Cap 1 capsule every week for 8 weeks, then 1 capsule every month thereafter. (Patient not taking: Reported on 6/12/2025) 12 capsule 0

## 2025-06-12 NOTE — PATIENT INSTRUCTIONS
Prostatitis   WHAT YOU SHOULD KNOW:   The prostate gland is a male sex gland. Fluid made by the prostate mixes with sperm (from the testicles) to make semen. Prostatitis ([id number]) is an infection or inflammation of the prostate gland. Men of any age can have prostatitis, and they may get it more than once. Prostatitis may be caused by certain diseases, infections, procedures, or a large prostate gland. Prostatitis is not contagious (not able to be spread) to a sexual partner. Symptoms of prostatitis may include pain, problems urinating, blood in your urine, and fever. Medicine and certain therapies can be used to treat prostatitis. The prostate gland is the size and shape of a walnut. It is found in front of the rectum (area of the intestines that holds your bowel movements). The prostate wraps around the urethra and the neck of the bladder.         Chronic prostatitis is more common in older men. It is usually an inflammatory condition and not an infection. But, bacterial infection can also cause chronic prostatitis. It can cause pain in the rectum, urethra, bladder, or scrotum. It can also make you unable to fully empty the bladder.  You may urinate often, or have burning with urination. Prostatitis may also cause painful ejaculation, erectile dysfunction, or prolonged erections.  Things that may trigger prostatitis include:  Sitting for long periods of time (especially on a hard surface or sitting on things that shake)  Squats/deadlifts  Sudden onset (acute) prostatitis usually occurs in men younger than 35. It is from a bacterial infection. You may have severe symptoms such as fever, chills, muscle aches, and pain in the area between the scrotum and anus (perineum). You may have a hard time urinating, or have pain or burning when urinating. There may be blood or pus in the urine.    Home care  These guidelines will help you care for yourself at home:    Avoid sitting at a 90 degree angle for long  periods of time. Standing or reclining is preferable. Avoid sitting on anything that shakes (tractors, lawn-mowers, long car rides) if possible. You may use a donut while sitting to avoid excessive pressure on the perineum/prostate while sitting.  Drink plenty of fluids/water.   Avoid/limit dietary irritants such as coffee, tea, carbonated beverages, sugary things, citrus fruits/juice, spicy foods.   To relieve pain, put ice packs on the inflamed area. You can make your own ice pack by putting ice cubes in a sealed plastic bag wrapped in a thin towel.  Soaking once or twice daily in a hot bath or using a heating pad may help alleviate discomfort in men suffering from chronic prostatitis. Would avoid excessive heat in the setting of acute prostatitis.  You may use over-the-counter medicines to control pain, unless another medicine was given. Ibuprofen is typically a very effective anti-inflammatory pain medication. You may take 400 mg twice daily for 1-2 weeks regardless of symptoms and as needed for discomfort thereafter. If you have chronic liver or kidney disease, talk with your healthcare provider before taking these medicines. Also talk with your provider if you've ever had a stomach ulcer or GI bleeding.  Urinate often, do not hold your bladder.  If you have symptoms such as weak stream your doctor may prescribe an alpha-blocker such as tamsulosin (flomax).  Make sure chronic pain issues are under good control, especially back pain issues as this is a significant risk factor for prostatitis/pelvic pain. Talk to your primary care or back specialist if your pain is not under adequate control.  Constipation causes straining and pain. Avoid constipation by eating natural laxatives such as prunes, fresh fruits, and whole-grain cereals. If needed, use a mild over-the-counter (OTC) laxative for constipation. An OTC stool softener such as colace may be used to keep the stools soft.  If sex is uncomfortable or painful,  avoid until symptoms get better. There is some evidence, however, that regular ejaculation may actually help improve prostatitis symptoms. You may have sex if you feel well.     Natural Ways to Boost Testosterone:    Exercise/Maintaining Ideal Body Weight  - Exercise at least three times per week, at least 30 minutes per session.  - Resistance training in particular (weight lifting) has been shown to increase T levels. Cardio exercise (jogging, elliptical, bike riding) is also important for heart health.  - Obesity can lead to excessive conversion of circulating testosterone to estrogen.    Healthy diet  - Focus on lean protein, complex carbohydrates, heart-healthy fats (avocados, olive oil)  - Avoid simple carbohydrates, processed foods    Get enough quality sleep & try to go to bed and wake up around the same time every day  - Most adults need around 7-9 hours sleep every night  - One study showed men who slept < 5 hours per night had a 10-15% lower T level compared to when they had a full night's sleep.  - Stick to a sleep schedule (regular bedtime and wake up time) as T secretion can decrease if this fluctuates regularly.    Minimize Stress  - High cortisol levels (the hormone produced during stress) leads to a reduction in circulating T.    Avoid Excessive Alcohol or Illicit Drugs    Regular Ejaculation

## 2025-07-12 ENCOUNTER — LAB ENCOUNTER (OUTPATIENT)
Dept: LAB | Age: 65
End: 2025-07-12
Attending: NURSE PRACTITIONER
Payer: COMMERCIAL

## 2025-07-12 DIAGNOSIS — E78.00 PURE HYPERCHOLESTEROLEMIA: ICD-10-CM

## 2025-07-12 DIAGNOSIS — N18.32 STAGE 3B CHRONIC KIDNEY DISEASE (HCC): ICD-10-CM

## 2025-07-12 DIAGNOSIS — I10 HTN (HYPERTENSION): Primary | ICD-10-CM

## 2025-07-12 DIAGNOSIS — E11.9 TYPE 2 DIABETES MELLITUS WITHOUT COMPLICATION, UNSPECIFIED WHETHER LONG TERM INSULIN USE (HCC): ICD-10-CM

## 2025-07-12 LAB
ALBUMIN SERPL-MCNC: 4.6 G/DL (ref 3.2–4.8)
ALBUMIN/GLOB SERPL: 1.6 {RATIO} (ref 1–2)
ALP LIVER SERPL-CCNC: 114 U/L (ref 45–117)
ALT SERPL-CCNC: 33 U/L (ref 10–49)
ANION GAP SERPL CALC-SCNC: 9 MMOL/L (ref 0–18)
AST SERPL-CCNC: 25 U/L (ref ?–34)
BASOPHILS # BLD AUTO: 0.05 X10(3) UL (ref 0–0.2)
BASOPHILS NFR BLD AUTO: 0.9 %
BILIRUB SERPL-MCNC: 0.8 MG/DL (ref 0.2–1.1)
BUN BLD-MCNC: 20 MG/DL (ref 9–23)
CALCIUM BLD-MCNC: 9.5 MG/DL (ref 8.7–10.6)
CHLORIDE SERPL-SCNC: 106 MMOL/L (ref 98–112)
CHOLEST SERPL-MCNC: 169 MG/DL (ref ?–200)
CO2 SERPL-SCNC: 24 MMOL/L (ref 21–32)
CREAT BLD-MCNC: 1.82 MG/DL (ref 0.7–1.3)
EGFRCR SERPLBLD CKD-EPI 2021: 41 ML/MIN/1.73M2 (ref 60–?)
EOSINOPHIL # BLD AUTO: 0.22 X10(3) UL (ref 0–0.7)
EOSINOPHIL NFR BLD AUTO: 3.9 %
ERYTHROCYTE [DISTWIDTH] IN BLOOD BY AUTOMATED COUNT: 13.9 %
EST. AVERAGE GLUCOSE BLD GHB EST-MCNC: 137 MG/DL (ref 68–126)
FASTING PATIENT LIPID ANSWER: YES
FASTING STATUS PATIENT QL REPORTED: YES
GLOBULIN PLAS-MCNC: 2.9 G/DL (ref 2–3.5)
GLUCOSE BLD-MCNC: 109 MG/DL (ref 70–99)
HBA1C MFR BLD: 6.4 % (ref ?–5.7)
HCT VFR BLD AUTO: 50.1 % (ref 39–53)
HDLC SERPL-MCNC: 53 MG/DL (ref 40–59)
HGB BLD-MCNC: 16.7 G/DL (ref 13–17.5)
IMM GRANULOCYTES # BLD AUTO: 0.02 X10(3) UL (ref 0–1)
IMM GRANULOCYTES NFR BLD: 0.4 %
LDLC SERPL CALC-MCNC: 89 MG/DL (ref ?–100)
LYMPHOCYTES # BLD AUTO: 1.92 X10(3) UL (ref 1–4)
LYMPHOCYTES NFR BLD AUTO: 33.9 %
MAGNESIUM SERPL-MCNC: 2.1 MG/DL (ref 1.6–2.6)
MCH RBC QN AUTO: 29.9 PG (ref 26–34)
MCHC RBC AUTO-ENTMCNC: 33.3 G/DL (ref 31–37)
MCV RBC AUTO: 89.8 FL (ref 80–100)
MONOCYTES # BLD AUTO: 0.53 X10(3) UL (ref 0.1–1)
MONOCYTES NFR BLD AUTO: 9.3 %
NEUTROPHILS # BLD AUTO: 2.93 X10 (3) UL (ref 1.5–7.7)
NEUTROPHILS # BLD AUTO: 2.93 X10(3) UL (ref 1.5–7.7)
NEUTROPHILS NFR BLD AUTO: 51.6 %
NONHDLC SERPL-MCNC: 116 MG/DL (ref ?–130)
OSMOLALITY SERPL CALC.SUM OF ELEC: 291 MOSM/KG (ref 275–295)
PHOSPHATE SERPL-MCNC: 3 MG/DL (ref 2.4–5.1)
PLATELET # BLD AUTO: 210 10(3)UL (ref 150–450)
POTASSIUM SERPL-SCNC: 4.5 MMOL/L (ref 3.5–5.1)
PROT SERPL-MCNC: 7.5 G/DL (ref 5.7–8.2)
RBC # BLD AUTO: 5.58 X10(6)UL (ref 4.3–5.7)
SODIUM SERPL-SCNC: 139 MMOL/L (ref 136–145)
TRIGL SERPL-MCNC: 157 MG/DL (ref 30–149)
VIT D+METAB SERPL-MCNC: 29.8 NG/ML (ref 30–100)
VLDLC SERPL CALC-MCNC: 25 MG/DL (ref 0–30)
WBC # BLD AUTO: 5.7 X10(3) UL (ref 4–11)

## 2025-07-12 PROCEDURE — 80053 COMPREHEN METABOLIC PANEL: CPT

## 2025-07-12 PROCEDURE — 82306 VITAMIN D 25 HYDROXY: CPT

## 2025-07-12 PROCEDURE — 80061 LIPID PANEL: CPT

## 2025-07-12 PROCEDURE — 36415 COLL VENOUS BLD VENIPUNCTURE: CPT

## 2025-07-12 PROCEDURE — 83036 HEMOGLOBIN GLYCOSYLATED A1C: CPT

## 2025-07-12 PROCEDURE — 84100 ASSAY OF PHOSPHORUS: CPT

## 2025-07-12 PROCEDURE — 85025 COMPLETE CBC W/AUTO DIFF WBC: CPT

## 2025-07-12 PROCEDURE — 83735 ASSAY OF MAGNESIUM: CPT

## (undated) DIAGNOSIS — R82.90 URINE FINDING: Primary | ICD-10-CM

## (undated) DEVICE — CYSTO CDS-LF: Brand: MEDLINE INDUSTRIES, INC.

## (undated) DEVICE — ZIPWIRE GUIDEWIRE .038X150 ANG

## (undated) DEVICE — SENS GUIW URO 150CM NIT SS

## (undated) DEVICE — SYRINGE,TOOMEY,IRRIGATION,70CC,STERILE: Brand: MEDLINE

## (undated) DEVICE — Device

## (undated) DEVICE — ZIPWIRE GUIDEWIRE .035X150 STR

## (undated) DEVICE — SYRINGE 30ML LL TIP

## (undated) DEVICE — BAG URINE LEG W/VELCRO

## (undated) DEVICE — FLUID JUMPSUIT DISP SD-100

## (undated) DEVICE — MONOPTY® DISPOSABLE CORE BIOPSY INSTRUMENT, 22MM PENETRATION DEPTH, 18G X 20CM: Brand: MONOPTY

## (undated) DEVICE — SCD SLEEVE KNEE HI BLEND

## (undated) DEVICE — URETERAL STENT
Type: IMPLANTABLE DEVICE | Site: URETER | Status: NON-FUNCTIONAL
Brand: PERCUFLEX™ PLUS
Removed: 2021-07-21

## (undated) DEVICE — HF-RESECTION ELECTRODE PLASMANEEDLE NEEDLE, RIGHT-ANGLED, 24 FR., 12°-30°, ESG TURIS: Brand: OLYMPUS

## (undated) DEVICE — SOL  .9 3000ML

## (undated) DEVICE — STERILE POLYISOPRENE POWDER-FREE SURGICAL GLOVES: Brand: PROTEXIS

## (undated) DEVICE — REM POLYHESIVE ADULT PATIENT RETURN ELECTRODE: Brand: VALLEYLAB

## (undated) DEVICE — 1071 S-DRP URO STLE-GAMA 10/BX,4X/C: Brand: STERI-DRAPE™

## (undated) DEVICE — URETERAL STENT
Type: IMPLANTABLE DEVICE | Site: URETER | Status: NON-FUNCTIONAL
Brand: ASCERTA™
Removed: 2021-07-21

## (undated) DEVICE — NON-ADHERENT PAD PREPACK: Brand: TELFA

## (undated) DEVICE — ST. TONGUE BLADES: Brand: DEROYAL

## (undated) DEVICE — TIGERTAIL 5F FLXTIP 70CM

## (undated) DEVICE — SOL H2O 1000ML BTL

## (undated) DEVICE — COVER,BOOT,FOAM,NON-SKID,HOOK-LOOP,XLG: Brand: MEDLINE INDUSTRIES, INC.

## (undated) DEVICE — HF-RESECTION ELECTRODE PLASMALOOP LOOP, LARGE, 24 FR., 12°/16°, ESG TURIS: Brand: OLYMPUS

## (undated) DEVICE — OPEN-END FLEXI-TIP URETERAL CATHETER: Brand: FLEXI-TIP

## (undated) NOTE — LETTER
[facility]  Phone: [phone]  Right Fax: [phone]  [facility] 20 By:  Omar Dodd RN Date: 7/19/21    Patient Name: Arlington Fritz  Surgery Date: 7/21/2021    CSN: [id number]  Medical Record: [id number]   D

## (undated) NOTE — Clinical Note
Hi Dr. Mo Rojas, I wanted to refer this pt to you. He used to see . He is s/p transuretheral prostatectomy - has some mild to mod LUTS.   Thanks  KP

## (undated) NOTE — MR AVS SNAPSHOT
After Visit Summary   11/9/2021    Yanci Jimenez   MRN: [id number]           Visit Information     Date & Time  11/9/2021  4:40 PM Provider  Derral Jose, [facility]phrology Dept.  Phone  [phone] minutes to complete it and provide feedback. We strive to deliver the best patient experience and are looking for ways to make improvements. Your feedback will help us do so. For more information on [employer], please visit [email or web address]